# Patient Record
Sex: MALE | Race: WHITE | Employment: FULL TIME | URBAN - METROPOLITAN AREA
[De-identification: names, ages, dates, MRNs, and addresses within clinical notes are randomized per-mention and may not be internally consistent; named-entity substitution may affect disease eponyms.]

---

## 2017-11-20 ENCOUNTER — OFFICE VISIT (OUTPATIENT)
Dept: FAMILY MEDICINE CLINIC | Facility: CLINIC | Age: 37
End: 2017-11-20

## 2017-11-20 VITALS
SYSTOLIC BLOOD PRESSURE: 138 MMHG | BODY MASS INDEX: 28.73 KG/M2 | HEIGHT: 69 IN | HEART RATE: 56 BPM | WEIGHT: 194 LBS | TEMPERATURE: 98 F | DIASTOLIC BLOOD PRESSURE: 88 MMHG | RESPIRATION RATE: 16 BRPM

## 2017-11-20 DIAGNOSIS — Z13.29 SCREENING FOR ENDOCRINE, NUTRITIONAL, METABOLIC AND IMMUNITY DISORDER: ICD-10-CM

## 2017-11-20 DIAGNOSIS — Z13.228 SCREENING FOR ENDOCRINE, NUTRITIONAL, METABOLIC AND IMMUNITY DISORDER: ICD-10-CM

## 2017-11-20 DIAGNOSIS — Z00.00 ANNUAL PHYSICAL EXAM: Primary | ICD-10-CM

## 2017-11-20 DIAGNOSIS — Z13.21 SCREENING FOR ENDOCRINE, NUTRITIONAL, METABOLIC AND IMMUNITY DISORDER: ICD-10-CM

## 2017-11-20 DIAGNOSIS — Z23 NEED FOR DIPHTHERIA-TETANUS-PERTUSSIS (TDAP) VACCINE: ICD-10-CM

## 2017-11-20 DIAGNOSIS — Z13.0 SCREENING FOR ENDOCRINE, NUTRITIONAL, METABOLIC AND IMMUNITY DISORDER: ICD-10-CM

## 2017-11-20 DIAGNOSIS — R03.0 BLOOD PRESSURE ELEVATED WITHOUT HISTORY OF HTN: ICD-10-CM

## 2017-11-20 DIAGNOSIS — J30.1 ACUTE SEASONAL ALLERGIC RHINITIS DUE TO POLLEN: ICD-10-CM

## 2017-11-20 PROCEDURE — 99395 PREV VISIT EST AGE 18-39: CPT | Performed by: FAMILY MEDICINE

## 2017-11-20 PROCEDURE — 90715 TDAP VACCINE 7 YRS/> IM: CPT | Performed by: FAMILY MEDICINE

## 2017-11-20 PROCEDURE — 90471 IMMUNIZATION ADMIN: CPT | Performed by: FAMILY MEDICINE

## 2017-11-20 NOTE — PROGRESS NOTES
Chief Complaint:   Patient presents with:   Annual    HPI:   This is a 44year old male presenting for physical patient's weight is lower than previous physical.  History of elevated blood pressure with no diagnosis of hypertension he reports no shortness o hematuria, flank pain and difficulty urinating. Musculoskeletal: Negative for joint pain, gait problem, neck pain and neck stiffness. Skin: Negative for color change, pallor, rash and wound.    Allergic/Immunologic: Negative for environmental allergies, motion. He exhibits no tenderness or effusion. Lymphadenopathy:     He has no cervical adenopathy. Neurological: He is alert and oriented to person, place, and time. No cranial nerve deficit or motor deficit. Gait normal.   Skin: Skin is warm and dry.

## 2018-01-04 ENCOUNTER — LAB ENCOUNTER (OUTPATIENT)
Dept: LAB | Age: 38
End: 2018-01-04
Attending: FAMILY MEDICINE
Payer: COMMERCIAL

## 2018-01-04 DIAGNOSIS — Z13.29 SCREENING FOR ENDOCRINE, NUTRITIONAL, METABOLIC AND IMMUNITY DISORDER: ICD-10-CM

## 2018-01-04 DIAGNOSIS — Z13.228 SCREENING FOR ENDOCRINE, NUTRITIONAL, METABOLIC AND IMMUNITY DISORDER: ICD-10-CM

## 2018-01-04 DIAGNOSIS — Z13.21 SCREENING FOR ENDOCRINE, NUTRITIONAL, METABOLIC AND IMMUNITY DISORDER: ICD-10-CM

## 2018-01-04 DIAGNOSIS — Z13.0 SCREENING FOR ENDOCRINE, NUTRITIONAL, METABOLIC AND IMMUNITY DISORDER: ICD-10-CM

## 2018-01-04 LAB
ALBUMIN SERPL-MCNC: 4 G/DL (ref 3.5–4.8)
ALP LIVER SERPL-CCNC: 74 U/L (ref 45–117)
ALT SERPL-CCNC: 46 U/L (ref 17–63)
AST SERPL-CCNC: 32 U/L (ref 15–41)
BASOPHILS # BLD AUTO: 0.03 X10(3) UL (ref 0–0.1)
BASOPHILS NFR BLD AUTO: 0.6 %
BILIRUB SERPL-MCNC: 1.7 MG/DL (ref 0.1–2)
BUN BLD-MCNC: 20 MG/DL (ref 8–20)
CALCIUM BLD-MCNC: 9.1 MG/DL (ref 8.3–10.3)
CHLORIDE: 105 MMOL/L (ref 101–111)
CHOLEST SMN-MCNC: 169 MG/DL (ref ?–200)
CO2: 28 MMOL/L (ref 22–32)
CREAT BLD-MCNC: 1.01 MG/DL (ref 0.7–1.3)
EOSINOPHIL # BLD AUTO: 0.1 X10(3) UL (ref 0–0.3)
EOSINOPHIL NFR BLD AUTO: 1.9 %
ERYTHROCYTE [DISTWIDTH] IN BLOOD BY AUTOMATED COUNT: 11.8 % (ref 11.5–16)
GLUCOSE BLD-MCNC: 85 MG/DL (ref 70–99)
HCT VFR BLD AUTO: 46.5 % (ref 37–53)
HDLC SERPL-MCNC: 45 MG/DL (ref 45–?)
HDLC SERPL: 3.76 {RATIO} (ref ?–4.97)
HGB BLD-MCNC: 16.1 G/DL (ref 13–17)
IMMATURE GRANULOCYTE COUNT: 0.01 X10(3) UL (ref 0–1)
IMMATURE GRANULOCYTE RATIO %: 0.2 %
LDLC SERPL CALC-MCNC: 108 MG/DL (ref ?–130)
LYMPHOCYTES # BLD AUTO: 2.26 X10(3) UL (ref 0.9–4)
LYMPHOCYTES NFR BLD AUTO: 42.5 %
M PROTEIN MFR SERPL ELPH: 7.5 G/DL (ref 6.1–8.3)
MCH RBC QN AUTO: 29.9 PG (ref 27–33.2)
MCHC RBC AUTO-ENTMCNC: 34.6 G/DL (ref 31–37)
MCV RBC AUTO: 86.4 FL (ref 80–99)
MONOCYTES # BLD AUTO: 0.36 X10(3) UL (ref 0.1–0.6)
MONOCYTES NFR BLD AUTO: 6.8 %
NEUTROPHIL ABS PRELIM: 2.56 X10 (3) UL (ref 1.3–6.7)
NEUTROPHILS # BLD AUTO: 2.56 X10(3) UL (ref 1.3–6.7)
NEUTROPHILS NFR BLD AUTO: 48 %
NONHDLC SERPL-MCNC: 124 MG/DL (ref ?–130)
PLATELET # BLD AUTO: 198 10(3)UL (ref 150–450)
POTASSIUM SERPL-SCNC: 3.7 MMOL/L (ref 3.6–5.1)
RBC # BLD AUTO: 5.38 X10(6)UL (ref 4.3–5.7)
RED CELL DISTRIBUTION WIDTH-SD: 36.8 FL (ref 35.1–46.3)
SODIUM SERPL-SCNC: 141 MMOL/L (ref 136–144)
TRIGL SERPL-MCNC: 78 MG/DL (ref ?–150)
TSI SER-ACNC: 2.81 MIU/ML (ref 0.35–5.5)
VLDLC SERPL CALC-MCNC: 16 MG/DL (ref 5–40)
WBC # BLD AUTO: 5.3 X10(3) UL (ref 4–13)

## 2018-01-04 PROCEDURE — 80050 GENERAL HEALTH PANEL: CPT | Performed by: FAMILY MEDICINE

## 2018-01-04 PROCEDURE — 36415 COLL VENOUS BLD VENIPUNCTURE: CPT | Performed by: FAMILY MEDICINE

## 2018-01-04 PROCEDURE — 80061 LIPID PANEL: CPT | Performed by: FAMILY MEDICINE

## 2019-02-05 ENCOUNTER — PATIENT OUTREACH (OUTPATIENT)
Dept: FAMILY MEDICINE CLINIC | Facility: CLINIC | Age: 39
End: 2019-02-05

## 2019-08-30 ENCOUNTER — WALK IN (OUTPATIENT)
Dept: URGENT CARE | Age: 39
End: 2019-08-30

## 2019-08-30 DIAGNOSIS — Z23 FLU VACCINE NEED: Primary | ICD-10-CM

## 2019-08-30 PROCEDURE — 90686 IIV4 VACC NO PRSV 0.5 ML IM: CPT | Performed by: NURSE PRACTITIONER

## 2019-08-30 PROCEDURE — 90471 IMMUNIZATION ADMIN: CPT | Performed by: NURSE PRACTITIONER

## 2021-06-11 ENCOUNTER — OFFICE VISIT (OUTPATIENT)
Dept: FAMILY MEDICINE CLINIC | Facility: CLINIC | Age: 41
End: 2021-06-11
Payer: COMMERCIAL

## 2021-06-11 VITALS
HEART RATE: 77 BPM | WEIGHT: 188.75 LBS | HEIGHT: 69.13 IN | SYSTOLIC BLOOD PRESSURE: 142 MMHG | RESPIRATION RATE: 16 BRPM | OXYGEN SATURATION: 98 % | DIASTOLIC BLOOD PRESSURE: 100 MMHG | BODY MASS INDEX: 27.64 KG/M2

## 2021-06-11 DIAGNOSIS — Z00.00 ANNUAL PHYSICAL EXAM: Primary | ICD-10-CM

## 2021-06-11 DIAGNOSIS — Z00.00 LABORATORY EXAMINATION ORDERED AS PART OF A ROUTINE GENERAL MEDICAL EXAMINATION: ICD-10-CM

## 2021-06-11 DIAGNOSIS — J30.2 SEASONAL ALLERGIES: ICD-10-CM

## 2021-06-11 DIAGNOSIS — R03.0 BLOOD PRESSURE ELEVATED WITHOUT HISTORY OF HTN: ICD-10-CM

## 2021-06-11 PROCEDURE — 99396 PREV VISIT EST AGE 40-64: CPT | Performed by: FAMILY MEDICINE

## 2021-06-11 PROCEDURE — 3077F SYST BP >= 140 MM HG: CPT | Performed by: FAMILY MEDICINE

## 2021-06-11 PROCEDURE — 3080F DIAST BP >= 90 MM HG: CPT | Performed by: FAMILY MEDICINE

## 2021-06-11 PROCEDURE — 3008F BODY MASS INDEX DOCD: CPT | Performed by: FAMILY MEDICINE

## 2021-06-11 RX ORDER — MULTIVIT-MIN/FOLIC/VIT K/LYCOP 400-300MCG
TABLET ORAL
COMMUNITY

## 2021-06-12 NOTE — PROGRESS NOTES
Chief Complaint:   Patient presents with:  Physical    HPI:   This is a 42year old male presenting for physical patient's weight is lower than previous physical.  History of elevated blood pressure with no diagnosis of hypertension he reports no shortness palpitations and leg swelling. Gastrointestinal: Negative for vomiting, abdominal pain, diarrhea, blood in stool and abdominal distention. Endocrine: Negative for cold intolerance, heat intolerance, polydipsia, polyphagia and polyuria.    Genitourinary: Edema not present. Pulmonary/Chest: Effort normal and breath sounds normal. No stridor. No respiratory distress. He has no wheezes. Abdominal: Soft. Bowel sounds are normal. He exhibits no distension. There is no abdominal tenderness.  There is no rebo

## 2021-06-14 ENCOUNTER — LAB ENCOUNTER (OUTPATIENT)
Dept: LAB | Age: 41
End: 2021-06-14
Attending: FAMILY MEDICINE
Payer: COMMERCIAL

## 2021-06-14 DIAGNOSIS — Z00.00 LABORATORY EXAMINATION ORDERED AS PART OF A ROUTINE GENERAL MEDICAL EXAMINATION: ICD-10-CM

## 2021-06-14 PROCEDURE — 84439 ASSAY OF FREE THYROXINE: CPT | Performed by: FAMILY MEDICINE

## 2021-06-14 PROCEDURE — 80061 LIPID PANEL: CPT | Performed by: FAMILY MEDICINE

## 2021-06-14 PROCEDURE — 80050 GENERAL HEALTH PANEL: CPT | Performed by: FAMILY MEDICINE

## 2021-06-30 ENCOUNTER — PATIENT MESSAGE (OUTPATIENT)
Dept: FAMILY MEDICINE CLINIC | Facility: CLINIC | Age: 41
End: 2021-06-30

## 2021-06-30 RX ORDER — LISINOPRIL AND HYDROCHLOROTHIAZIDE 12.5; 1 MG/1; MG/1
1 TABLET ORAL DAILY
Qty: 30 TABLET | Refills: 3 | Status: SHIPPED | OUTPATIENT
Start: 2021-06-30 | End: 2022-01-10

## 2021-06-30 NOTE — TELEPHONE ENCOUNTER
From: Gill Hurtado  To: Jojo Marinelli MD  Sent: 6/30/2021 1:58 PM CDT  Subject: Visit Julio César Favor Dr. Lissy Shaffer,    I'm following up with you about my blood pressure from my appointment from June 11th. I've been monitoring it the last couple of weeks and it is still in the 130s/140s over 90s. I've tried to vary the time of day, before/after meals, etc.    You mentioned possibly going on something for this. I'm thinking that might not be a bad idea considering I exercise regularly and generally eat well. Please advise, and thank you very much for your assistance.     Harvey Prieto

## 2021-06-30 NOTE — TELEPHONE ENCOUNTER
Pt sent message stating he has been monitoring BP at home and BP still is in the 130s to 140s over 90s. Per pt, he has checked BP at different times of the day. Pt asking if he can be placed on medication as previously discussed at this last ov. Please advise. Thank you.    LOV: 6/11/21

## 2022-01-11 RX ORDER — LISINOPRIL AND HYDROCHLOROTHIAZIDE 12.5; 1 MG/1; MG/1
1 TABLET ORAL DAILY
Qty: 30 TABLET | Refills: 0 | Status: SHIPPED | OUTPATIENT
Start: 2022-01-11 | End: 2022-02-10

## 2022-04-07 NOTE — TELEPHONE ENCOUNTER
Pt sent message requesting for medication refill of Lisinopril-hydroCHLOROthiazide 10-12.5 MG Oral Tablet. Pt was instructed to schedule appt since last ov was 06/11/21. Pt did not schedule f/u ofelia for August/Sept 2021. Please approve or deny pending Rx. Thank you.    LF: 01/11/22

## 2022-04-08 RX ORDER — LISINOPRIL AND HYDROCHLOROTHIAZIDE 12.5; 1 MG/1; MG/1
1 TABLET ORAL DAILY
Qty: 30 TABLET | Refills: 0 | Status: SHIPPED | OUTPATIENT
Start: 2022-04-08 | End: 2022-05-08

## 2022-04-18 ENCOUNTER — OFFICE VISIT (OUTPATIENT)
Dept: FAMILY MEDICINE CLINIC | Facility: CLINIC | Age: 42
End: 2022-04-18
Payer: COMMERCIAL

## 2022-04-18 VITALS
SYSTOLIC BLOOD PRESSURE: 140 MMHG | OXYGEN SATURATION: 98 % | HEART RATE: 76 BPM | WEIGHT: 188 LBS | DIASTOLIC BLOOD PRESSURE: 82 MMHG | HEIGHT: 69.13 IN | RESPIRATION RATE: 16 BRPM | BODY MASS INDEX: 27.53 KG/M2

## 2022-04-18 DIAGNOSIS — R03.0 BLOOD PRESSURE ELEVATED WITHOUT HISTORY OF HTN: ICD-10-CM

## 2022-04-18 DIAGNOSIS — J30.2 SEASONAL ALLERGIES: Primary | ICD-10-CM

## 2022-04-18 PROCEDURE — 3077F SYST BP >= 140 MM HG: CPT | Performed by: FAMILY MEDICINE

## 2022-04-18 PROCEDURE — 99214 OFFICE O/P EST MOD 30 MIN: CPT | Performed by: FAMILY MEDICINE

## 2022-04-18 PROCEDURE — 3079F DIAST BP 80-89 MM HG: CPT | Performed by: FAMILY MEDICINE

## 2022-04-18 PROCEDURE — 3008F BODY MASS INDEX DOCD: CPT | Performed by: FAMILY MEDICINE

## 2022-04-18 RX ORDER — LISINOPRIL AND HYDROCHLOROTHIAZIDE 12.5; 1 MG/1; MG/1
1 TABLET ORAL DAILY
Qty: 90 TABLET | Refills: 1 | Status: SHIPPED | OUTPATIENT
Start: 2022-04-18 | End: 2022-07-17

## 2022-11-04 ENCOUNTER — OFFICE VISIT (OUTPATIENT)
Dept: FAMILY MEDICINE CLINIC | Facility: CLINIC | Age: 42
End: 2022-11-04
Payer: COMMERCIAL

## 2022-11-04 VITALS
BODY MASS INDEX: 29.87 KG/M2 | DIASTOLIC BLOOD PRESSURE: 84 MMHG | SYSTOLIC BLOOD PRESSURE: 120 MMHG | HEART RATE: 68 BPM | WEIGHT: 204 LBS | OXYGEN SATURATION: 97 % | RESPIRATION RATE: 16 BRPM | HEIGHT: 69.13 IN

## 2022-11-04 DIAGNOSIS — R03.0 BLOOD PRESSURE ELEVATED WITHOUT HISTORY OF HTN: ICD-10-CM

## 2022-11-04 DIAGNOSIS — R73.03 PRE-DIABETES: ICD-10-CM

## 2022-11-04 DIAGNOSIS — S46.912A STRAIN OF LEFT SHOULDER, INITIAL ENCOUNTER: ICD-10-CM

## 2022-11-04 DIAGNOSIS — R06.81 APNEA: ICD-10-CM

## 2022-11-04 DIAGNOSIS — Z00.00 ANNUAL PHYSICAL EXAM: Primary | ICD-10-CM

## 2022-11-04 PROCEDURE — 3008F BODY MASS INDEX DOCD: CPT | Performed by: FAMILY MEDICINE

## 2022-11-04 PROCEDURE — 99396 PREV VISIT EST AGE 40-64: CPT | Performed by: FAMILY MEDICINE

## 2022-11-04 PROCEDURE — 3079F DIAST BP 80-89 MM HG: CPT | Performed by: FAMILY MEDICINE

## 2022-11-04 PROCEDURE — 3074F SYST BP LT 130 MM HG: CPT | Performed by: FAMILY MEDICINE

## 2022-11-04 RX ORDER — LISINOPRIL AND HYDROCHLOROTHIAZIDE 12.5; 1 MG/1; MG/1
1 TABLET ORAL DAILY
COMMUNITY
End: 2022-11-05

## 2022-11-07 RX ORDER — LISINOPRIL AND HYDROCHLOROTHIAZIDE 12.5; 1 MG/1; MG/1
1 TABLET ORAL DAILY
Qty: 90 TABLET | Refills: 3 | Status: SHIPPED | OUTPATIENT
Start: 2022-11-07

## 2022-11-18 ENCOUNTER — TELEPHONE (OUTPATIENT)
Dept: PHYSICAL THERAPY | Facility: HOSPITAL | Age: 42
End: 2022-11-18

## 2022-11-18 ENCOUNTER — LAB ENCOUNTER (OUTPATIENT)
Dept: LAB | Age: 42
End: 2022-11-18
Attending: FAMILY MEDICINE
Payer: COMMERCIAL

## 2022-11-18 DIAGNOSIS — R03.0 BLOOD PRESSURE ELEVATED WITHOUT HISTORY OF HTN: ICD-10-CM

## 2022-11-18 DIAGNOSIS — R73.03 PRE-DIABETES: ICD-10-CM

## 2022-11-18 LAB
ALBUMIN SERPL-MCNC: 3.9 G/DL (ref 3.4–5)
ALBUMIN/GLOB SERPL: 1.1 {RATIO} (ref 1–2)
ALP LIVER SERPL-CCNC: 77 U/L
ALT SERPL-CCNC: 31 U/L
ANION GAP SERPL CALC-SCNC: 8 MMOL/L (ref 0–18)
AST SERPL-CCNC: 18 U/L (ref 15–37)
BASOPHILS # BLD AUTO: 0.07 X10(3) UL (ref 0–0.2)
BASOPHILS NFR BLD AUTO: 1 %
BILIRUB SERPL-MCNC: 1.6 MG/DL (ref 0.1–2)
BUN BLD-MCNC: 19 MG/DL (ref 7–18)
CALCIUM BLD-MCNC: 9.4 MG/DL (ref 8.5–10.1)
CHLORIDE SERPL-SCNC: 103 MMOL/L (ref 98–112)
CHOLEST SERPL-MCNC: 176 MG/DL (ref ?–200)
CO2 SERPL-SCNC: 27 MMOL/L (ref 21–32)
CREAT BLD-MCNC: 1.06 MG/DL
EOSINOPHIL # BLD AUTO: 0.11 X10(3) UL (ref 0–0.7)
EOSINOPHIL NFR BLD AUTO: 1.6 %
ERYTHROCYTE [DISTWIDTH] IN BLOOD BY AUTOMATED COUNT: 12.3 %
EST. AVERAGE GLUCOSE BLD GHB EST-MCNC: 103 MG/DL (ref 68–126)
FASTING PATIENT LIPID ANSWER: YES
FASTING STATUS PATIENT QL REPORTED: YES
GFR SERPLBLD BASED ON 1.73 SQ M-ARVRAT: 89 ML/MIN/1.73M2 (ref 60–?)
GLOBULIN PLAS-MCNC: 3.5 G/DL (ref 2.8–4.4)
GLUCOSE BLD-MCNC: 105 MG/DL (ref 70–99)
HBA1C MFR BLD: 5.2 % (ref ?–5.7)
HCT VFR BLD AUTO: 49.4 %
HDLC SERPL-MCNC: 47 MG/DL (ref 40–59)
HGB BLD-MCNC: 16.7 G/DL
IMM GRANULOCYTES # BLD AUTO: 0.01 X10(3) UL (ref 0–1)
IMM GRANULOCYTES NFR BLD: 0.1 %
LDLC SERPL CALC-MCNC: 119 MG/DL (ref ?–100)
LYMPHOCYTES # BLD AUTO: 2.17 X10(3) UL (ref 1–4)
LYMPHOCYTES NFR BLD AUTO: 31 %
MCH RBC QN AUTO: 30.9 PG (ref 26–34)
MCHC RBC AUTO-ENTMCNC: 33.8 G/DL (ref 31–37)
MCV RBC AUTO: 91.3 FL
MONOCYTES # BLD AUTO: 0.51 X10(3) UL (ref 0.1–1)
MONOCYTES NFR BLD AUTO: 7.3 %
NEUTROPHILS # BLD AUTO: 4.12 X10 (3) UL (ref 1.5–7.7)
NEUTROPHILS # BLD AUTO: 4.12 X10(3) UL (ref 1.5–7.7)
NEUTROPHILS NFR BLD AUTO: 59 %
NONHDLC SERPL-MCNC: 129 MG/DL (ref ?–130)
OSMOLALITY SERPL CALC.SUM OF ELEC: 289 MOSM/KG (ref 275–295)
PLATELET # BLD AUTO: 205 10(3)UL (ref 150–450)
POTASSIUM SERPL-SCNC: 4.1 MMOL/L (ref 3.5–5.1)
PROT SERPL-MCNC: 7.4 G/DL (ref 6.4–8.2)
RBC # BLD AUTO: 5.41 X10(6)UL
SODIUM SERPL-SCNC: 138 MMOL/L (ref 136–145)
T4 FREE SERPL-MCNC: 1 NG/DL (ref 0.8–1.7)
TRIGL SERPL-MCNC: 49 MG/DL (ref 30–149)
TSI SER-ACNC: 1.44 MIU/ML (ref 0.36–3.74)
VLDLC SERPL CALC-MCNC: 9 MG/DL (ref 0–30)
WBC # BLD AUTO: 7 X10(3) UL (ref 4–11)

## 2022-11-18 PROCEDURE — 84439 ASSAY OF FREE THYROXINE: CPT | Performed by: FAMILY MEDICINE

## 2022-11-18 PROCEDURE — 80061 LIPID PANEL: CPT | Performed by: FAMILY MEDICINE

## 2022-11-18 PROCEDURE — 83036 HEMOGLOBIN GLYCOSYLATED A1C: CPT | Performed by: FAMILY MEDICINE

## 2022-11-18 PROCEDURE — 80050 GENERAL HEALTH PANEL: CPT | Performed by: FAMILY MEDICINE

## 2022-11-22 ENCOUNTER — OFFICE VISIT (OUTPATIENT)
Dept: PHYSICAL THERAPY | Age: 42
End: 2022-11-22
Attending: FAMILY MEDICINE
Payer: COMMERCIAL

## 2022-11-22 DIAGNOSIS — S46.912D STRAIN OF LEFT SHOULDER, SUBSEQUENT ENCOUNTER: ICD-10-CM

## 2022-11-22 DIAGNOSIS — M25.512 ACUTE PAIN OF LEFT SHOULDER: Primary | ICD-10-CM

## 2022-11-22 PROCEDURE — 97161 PT EVAL LOW COMPLEX 20 MIN: CPT

## 2022-11-22 PROCEDURE — 97110 THERAPEUTIC EXERCISES: CPT

## 2022-11-28 ENCOUNTER — OFFICE VISIT (OUTPATIENT)
Dept: PHYSICAL THERAPY | Age: 42
End: 2022-11-28
Attending: FAMILY MEDICINE
Payer: COMMERCIAL

## 2022-11-28 DIAGNOSIS — M25.512 ACUTE PAIN OF LEFT SHOULDER: Primary | ICD-10-CM

## 2022-11-28 DIAGNOSIS — S46.912D STRAIN OF LEFT SHOULDER, SUBSEQUENT ENCOUNTER: ICD-10-CM

## 2022-11-28 PROCEDURE — 97110 THERAPEUTIC EXERCISES: CPT

## 2022-11-28 PROCEDURE — 97140 MANUAL THERAPY 1/> REGIONS: CPT

## 2022-11-28 NOTE — PROGRESS NOTES
PT DAILY NOTE  Diagnosis:   Acute left shoulder pain (M25.512), Strain of left shoulder subsequent encounter (S40.989I)   Referring Provider: Jose Alberto Whitley  Date of Evaluation:    11/21/2022    Precautions:  None Next MD visit:   none scheduled  Date of Surgery: n/a  Symptom onset: August 2022 after lifting weights     Insurance Primary/Secondary: [employer] PPO     Visit 2 of 8   Date POC Expires: 2-       Subjective: Pt states ex's are helping. \"Slept on it a little weird\", mild pain. Pain: 2/10 at start of session  @eval: Dave Pillai is a 36year old male who presents to therapy today with complaints of left shoulder pain since August after lifting weights at home. Thought it was a muscle strain, but it hasn't gotten much better, despite stopping weight lifting. Pain c reaching behind back, putting belt on, has improved c 'fly' movement but still painful. He prefers to sleep on Lf side, which he can still do, but it feels stiff in the morning. Denies any N/T LUE. Pt is Rt hand dominant. 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 No previous hx of this pain. Pt describes pain level at best 0/10, at worst 3/10. Current functional limitations include reaching behind back, abd/fly position, laying on Lf side, has stopped wtlifting and holding off on planks. Rosanna Claros describes prior level of function full and painfree. Pt goals include resolve pain. Past medical history was reviewed with Rosanna Claros. Significant findings include pre-diabetes. Objective:   Mild GH hypomobility AP glide today, added jt mobs  Added LUE behind back stretching, c scap retracted, able to perform painfree    Treatment:(\"NP\" indicates Not Performed this date)  Manual Therapy: Added STM Lf UT/supraspinatus  Added Lf GH AP glides    Neuromuscular Re-education:    Therapeutic Exercise:   Adde retro UBE for scap mobility 5min  Prone scap squeezes 5\"x10  Prone mid trap 5\"x10  Added standing scap retract+ BUE ext c cane 3\"x10  Added standing IR behind back stretch c cane 10\"x5  Standing B shld ER YTB 5\"x10  Added seated Lf levator scap stretch 20\"x3  Doorway stretch (arms low) 20\"x3  Added bentover row 6# 2\"x20 ea Lf, Rt  Added bentover shld ext 6# 2\"x20 ea Lf, Rt  Added D1 extension GTB Lf 5\"x10    Future sessions: side forearm plank, SB pushups on wall, bodyblade, scaption    HEP: ([email or web address], code [email or web address])  Initial-prone scap squeezes, doorway stretch, B shld ER vs YTB  11-28-22: add bentover rows, has 8# at home    Assessment/Plan: Pt tolerated several new ex's today s any incr in pain. He has some upper quarter tighntess on Lf which was addressed c manual tx. New ex's today to promote incr scapular strength and open anterior shld to decrease impingement. Cont to address deficits to work toward [employer] and set goals. PLAN OF CARE:    Goals: (to be met in 8 visits)   1. Consistently decr pain Lf shld < or = 1/10 intermittent for incr QOL and activity tolerance  2. Pt able to sleep on Lf side painfree for PLOF  3. incr Lf shld/scap MMT at least 1/2 grade painfree for improved shoulder mechanics s symptoms  4. Pt able to reach behind back painfree for able to put on belt or wash back or PLOF  5. indep HEP to promote cont progress toward functional goals    Frequency / Duration: Patient will be seen for 2 x/week or a total of 8 visits over a 90 day period. All Treatments performed at distinct and separate times during the therapy session.   Treatment Minutes  Units charged   Manual Therapy 8 minutes 1   Therapeutic Activity 0 minutes    Neuromuscular Re-education 0 minutes    Therapeutic Exercise 37 minutes 2   Total Direct Treatment Time 45 minutes

## 2022-12-02 ENCOUNTER — APPOINTMENT (OUTPATIENT)
Dept: PHYSICAL THERAPY | Age: 42
End: 2022-12-02
Attending: FAMILY MEDICINE
Payer: COMMERCIAL

## 2022-12-02 ENCOUNTER — TELEPHONE (OUTPATIENT)
Dept: PHYSICAL THERAPY | Facility: HOSPITAL | Age: 42
End: 2022-12-02

## 2022-12-05 ENCOUNTER — OFFICE VISIT (OUTPATIENT)
Dept: PHYSICAL THERAPY | Age: 42
End: 2022-12-05
Attending: FAMILY MEDICINE
Payer: COMMERCIAL

## 2022-12-05 DIAGNOSIS — S46.912D STRAIN OF LEFT SHOULDER, SUBSEQUENT ENCOUNTER: ICD-10-CM

## 2022-12-05 DIAGNOSIS — M25.512 ACUTE PAIN OF LEFT SHOULDER: Primary | ICD-10-CM

## 2022-12-05 PROCEDURE — 97110 THERAPEUTIC EXERCISES: CPT

## 2022-12-05 PROCEDURE — 97140 MANUAL THERAPY 1/> REGIONS: CPT

## 2022-12-05 NOTE — PROGRESS NOTES
PT DAILY NOTE  Diagnosis:   Acute left shoulder pain (M25.512), Strain of left shoulder subsequent encounter (X12.298K)   Referring Provider: Jules Oliva  Date of Evaluation:    11/21/2022    Precautions:  None Next MD visit:   none scheduled  Date of Surgery: n/a  Symptom onset: August 2022 after lifting weights     Insurance Primary/Secondary: [employer] PPO     Visit 3 of 8   Date POC Expires: 2-       Subjective: Pt states he was a little sore this morning after sleeping on Rt side and Lf arm being in an awkward position overnight. He is able to sleep comfortably on the Lf side. Pain: 0/10 at start of session  @eval: Nico Hernandez is a 36year old male who presents to therapy today with complaints of left shoulder pain since August after lifting weights at home. Thought it was a muscle strain, but it hasn't gotten much better, despite stopping weight lifting. Pain c reaching behind back, putting belt on, has improved c 'fly' movement but still painful. He prefers to sleep on Lf side, which he can still do, but it feels stiff in the morning. Denies any N/T LUE. Pt is Rt hand dominant. 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 No previous hx of this pain. Pt describes pain level at best 0/10, at worst 3/10. Current functional limitations include reaching behind back, abd/fly position, laying on Lf side, has stopped wtlifting and holding off on planks. Henok Razo describes prior level of function full and painfree. Pt goals include resolve pain. Past medical history was reviewed with Henok Razo. Significant findings include pre-diabetes.     Objective:   Pt reports notably incr difficulty Lf side plank vs Rt, but able to perform s pain    Treatment:(\"NP\" indicates Not Performed this date)  Manual Therapy:  STM Lf UT/supraspinatus  Lf GH AP caleb    Neuromuscular Re-education:    Therapeutic Exercise:  retro UBE for scap mobility 5min  Prone scap squeezes 5\"x10  Prone mid trap 5\"x10  standing scap retract+ BUE ext c cane 3\"x10  standing IR behind back stretch c cane 10\"x5  Standing B shld ER YTB 5\"x10  seated Lf levator scap stretch 20\"x3  Doorway stretch (arms low) 20\"x3  bentover row 6# 2\"x20 ea Lf, Rt  bentover shld ext 6# 2\"x20 ea Lf, Rt  D1 extension GTB Lf 5\"x15 (increased)  Added side planks c straight legs 10\"x5  Added B ->single-arm straight arm plank rotation x 10 ea Rt, Lf     Future sessions: SB pushups on wall, bodyblade, scaption    HEP: ([email or web address], code [email or web address])  Initial-prone scap squeezes, doorway stretch, B shld ER vs YTB  11-28-22: add bentover rows, has 8# at home  12-5-22: pt to add side planks    Assessment/Plan: Pt reports difficulty c sustained postures overnight or performing an activity, but no lasting pain. Added side planks and rotational/stabilization B to single-arm planks which he tolerated well, c appropriate challenge. Pt to add side planks to HEP. Continue to progress as tolerated. PLAN OF CARE:    Goals: (to be met in 8 visits)   1. Consistently decr pain Lf shld < or = 1/10 intermittent for incr QOL and activity tolerance  2. Pt able to sleep on Lf side painfree for PLOF  3. incr Lf shld/scap MMT at least 1/2 grade painfree for improved shoulder mechanics s symptoms  4. Pt able to reach behind back painfree for able to put on belt or wash back or PLOF  5. indep HEP to promote cont progress toward functional goals    Frequency / Duration: Patient will be seen for 2 x/week or a total of 8 visits over a 90 day period. All Treatments performed at distinct and separate times during the therapy session.   Treatment Minutes  Units charged   Manual Therapy 8 minutes 1   Therapeutic Activity 0 minutes    Neuromuscular Re-education 0 minutes    Therapeutic Exercise 37 minutes 2   Total Direct Treatment Time 45 minutes

## 2022-12-09 ENCOUNTER — APPOINTMENT (OUTPATIENT)
Dept: PHYSICAL THERAPY | Age: 42
End: 2022-12-09
Attending: FAMILY MEDICINE
Payer: COMMERCIAL

## 2022-12-12 ENCOUNTER — APPOINTMENT (OUTPATIENT)
Dept: PHYSICAL THERAPY | Age: 42
End: 2022-12-12
Attending: FAMILY MEDICINE
Payer: COMMERCIAL

## 2022-12-12 ENCOUNTER — TELEPHONE (OUTPATIENT)
Dept: PHYSICAL THERAPY | Facility: HOSPITAL | Age: 42
End: 2022-12-12

## 2022-12-28 NOTE — TELEPHONE ENCOUNTER
LF 6/30/2021 with 30 tabs + 3 refills   LOV 6/11/2021  Follow up 6-12 months / due for f/u
Discharged

## 2023-01-05 ENCOUNTER — OFFICE VISIT (OUTPATIENT)
Dept: SLEEP CENTER | Age: 43
End: 2023-01-05
Attending: FAMILY MEDICINE
Payer: COMMERCIAL

## 2023-01-05 DIAGNOSIS — R06.81 APNEA: ICD-10-CM

## 2023-01-05 PROCEDURE — 95810 POLYSOM 6/> YRS 4/> PARAM: CPT

## 2023-01-05 PROCEDURE — 95811 POLYSOM 6/>YRS CPAP 4/> PARM: CPT

## 2023-01-12 ENCOUNTER — SLEEP STUDY (OUTPATIENT)
Facility: CLINIC | Age: 43
End: 2023-01-12
Payer: COMMERCIAL

## 2023-01-12 DIAGNOSIS — G47.33 OBSTRUCTIVE SLEEP APNEA SYNDROME: Primary | ICD-10-CM

## 2023-01-12 PROCEDURE — 95810 POLYSOM 6/> YRS 4/> PARAM: CPT | Performed by: OTHER

## 2023-01-19 ENCOUNTER — TELEPHONE (OUTPATIENT)
Dept: PHYSICAL THERAPY | Age: 43
End: 2023-01-19

## 2023-02-17 ENCOUNTER — TELEPHONE (OUTPATIENT)
Dept: PHYSICAL THERAPY | Facility: HOSPITAL | Age: 43
End: 2023-02-17

## 2023-09-18 ENCOUNTER — OFFICE VISIT (OUTPATIENT)
Dept: FAMILY MEDICINE CLINIC | Facility: CLINIC | Age: 43
End: 2023-09-18
Payer: COMMERCIAL

## 2023-09-18 VITALS
RESPIRATION RATE: 16 BRPM | SYSTOLIC BLOOD PRESSURE: 120 MMHG | OXYGEN SATURATION: 95 % | DIASTOLIC BLOOD PRESSURE: 80 MMHG | WEIGHT: 203 LBS | BODY MASS INDEX: 29.73 KG/M2 | HEIGHT: 69.13 IN | HEART RATE: 90 BPM

## 2023-09-18 DIAGNOSIS — Z00.00 ANNUAL PHYSICAL EXAM: Primary | ICD-10-CM

## 2023-09-18 DIAGNOSIS — I10 PRIMARY HYPERTENSION: ICD-10-CM

## 2023-09-18 PROCEDURE — 3079F DIAST BP 80-89 MM HG: CPT | Performed by: FAMILY MEDICINE

## 2023-09-18 PROCEDURE — 3074F SYST BP LT 130 MM HG: CPT | Performed by: FAMILY MEDICINE

## 2023-09-18 PROCEDURE — 3008F BODY MASS INDEX DOCD: CPT | Performed by: FAMILY MEDICINE

## 2023-09-18 PROCEDURE — 99396 PREV VISIT EST AGE 40-64: CPT | Performed by: FAMILY MEDICINE

## 2023-09-18 RX ORDER — LISINOPRIL AND HYDROCHLOROTHIAZIDE 12.5; 1 MG/1; MG/1
1 TABLET ORAL DAILY
Qty: 90 TABLET | Refills: 3 | Status: SHIPPED | OUTPATIENT
Start: 2023-09-18

## 2023-09-30 ENCOUNTER — PATIENT MESSAGE (OUTPATIENT)
Dept: FAMILY MEDICINE CLINIC | Facility: CLINIC | Age: 43
End: 2023-09-30

## 2023-09-30 DIAGNOSIS — Z00.00 LABORATORY EXAMINATION ORDERED AS PART OF A ROUTINE GENERAL MEDICAL EXAMINATION: Primary | ICD-10-CM

## 2023-10-02 NOTE — TELEPHONE ENCOUNTER
From: Angella Keith  To: Tereza Wright  Sent: 9/30/2023 7:17 PM CDT  Subject: Lab Request?     Hi doctor,  I was in a week and half ago, I went in this morning to do my blood labs and the lady at check in said that they were not requested by you. Just checking if I should have those done or not.  Thank you,     [employer]

## 2023-11-02 DIAGNOSIS — Z13.6 SCREENING FOR ISCHEMIC HEART DISEASE: Primary | ICD-10-CM

## 2023-11-04 ENCOUNTER — LAB ENCOUNTER (OUTPATIENT)
Dept: LAB | Age: 43
End: 2023-11-04
Attending: FAMILY MEDICINE
Payer: COMMERCIAL

## 2023-11-04 DIAGNOSIS — Z00.00 LABORATORY EXAMINATION ORDERED AS PART OF A ROUTINE GENERAL MEDICAL EXAMINATION: ICD-10-CM

## 2023-11-04 LAB
ALBUMIN SERPL-MCNC: 4 G/DL (ref 3.4–5)
ALBUMIN/GLOB SERPL: 1.2 {RATIO} (ref 1–2)
ALP LIVER SERPL-CCNC: 87 U/L
ALT SERPL-CCNC: 44 U/L
ANION GAP SERPL CALC-SCNC: 3 MMOL/L (ref 0–18)
AST SERPL-CCNC: 25 U/L (ref 15–37)
BASOPHILS # BLD AUTO: 0.06 X10(3) UL (ref 0–0.2)
BASOPHILS NFR BLD AUTO: 1 %
BILIRUB SERPL-MCNC: 1.3 MG/DL (ref 0.1–2)
BUN BLD-MCNC: 15 MG/DL (ref 9–23)
CALCIUM BLD-MCNC: 9.3 MG/DL (ref 8.5–10.1)
CHLORIDE SERPL-SCNC: 107 MMOL/L (ref 98–112)
CHOLEST SERPL-MCNC: 157 MG/DL (ref ?–200)
CO2 SERPL-SCNC: 31 MMOL/L (ref 21–32)
CREAT BLD-MCNC: 1.11 MG/DL
EGFRCR SERPLBLD CKD-EPI 2021: 84 ML/MIN/1.73M2 (ref 60–?)
EOSINOPHIL # BLD AUTO: 0.18 X10(3) UL (ref 0–0.7)
EOSINOPHIL NFR BLD AUTO: 3.1 %
ERYTHROCYTE [DISTWIDTH] IN BLOOD BY AUTOMATED COUNT: 12 %
FASTING PATIENT LIPID ANSWER: YES
FASTING STATUS PATIENT QL REPORTED: YES
GLOBULIN PLAS-MCNC: 3.3 G/DL (ref 2.8–4.4)
GLUCOSE BLD-MCNC: 96 MG/DL (ref 70–99)
HCT VFR BLD AUTO: 48 %
HDLC SERPL-MCNC: 45 MG/DL (ref 40–59)
HGB BLD-MCNC: 16.4 G/DL
IMM GRANULOCYTES # BLD AUTO: 0.02 X10(3) UL (ref 0–1)
IMM GRANULOCYTES NFR BLD: 0.3 %
LDLC SERPL CALC-MCNC: 103 MG/DL (ref ?–100)
LYMPHOCYTES # BLD AUTO: 2.16 X10(3) UL (ref 1–4)
LYMPHOCYTES NFR BLD AUTO: 37.5 %
MCH RBC QN AUTO: 30.8 PG (ref 26–34)
MCHC RBC AUTO-ENTMCNC: 34.2 G/DL (ref 31–37)
MCV RBC AUTO: 90.2 FL
MONOCYTES # BLD AUTO: 0.4 X10(3) UL (ref 0.1–1)
MONOCYTES NFR BLD AUTO: 6.9 %
NEUTROPHILS # BLD AUTO: 2.94 X10 (3) UL (ref 1.5–7.7)
NEUTROPHILS # BLD AUTO: 2.94 X10(3) UL (ref 1.5–7.7)
NEUTROPHILS NFR BLD AUTO: 51.2 %
NONHDLC SERPL-MCNC: 112 MG/DL (ref ?–130)
OSMOLALITY SERPL CALC.SUM OF ELEC: 293 MOSM/KG (ref 275–295)
PLATELET # BLD AUTO: 201 10(3)UL (ref 150–450)
POTASSIUM SERPL-SCNC: 4.6 MMOL/L (ref 3.5–5.1)
PROT SERPL-MCNC: 7.3 G/DL (ref 6.4–8.2)
RBC # BLD AUTO: 5.32 X10(6)UL
SODIUM SERPL-SCNC: 141 MMOL/L (ref 136–145)
TRIGL SERPL-MCNC: 38 MG/DL (ref 30–149)
TSI SER-ACNC: 1.62 MIU/ML (ref 0.36–3.74)
VLDLC SERPL CALC-MCNC: 6 MG/DL (ref 0–30)
WBC # BLD AUTO: 5.8 X10(3) UL (ref 4–11)

## 2023-11-04 PROCEDURE — 80050 GENERAL HEALTH PANEL: CPT | Performed by: FAMILY MEDICINE

## 2023-11-04 PROCEDURE — 80061 LIPID PANEL: CPT | Performed by: FAMILY MEDICINE

## 2023-11-16 ENCOUNTER — IMAGING SERVICES (OUTPATIENT)
Dept: CT IMAGING | Age: 43
End: 2023-11-16

## 2023-11-16 DIAGNOSIS — Z13.6 SCREENING FOR ISCHEMIC HEART DISEASE: ICD-10-CM

## 2023-11-16 PROCEDURE — 75571 CT HRT W/O DYE W/CA TEST: CPT | Performed by: RADIOLOGY

## 2023-11-30 ENCOUNTER — TELEPHONE (OUTPATIENT)
Dept: CARDIOLOGY | Age: 43
End: 2023-11-30

## 2024-08-10 ENCOUNTER — HOSPITAL ENCOUNTER (EMERGENCY)
Age: 44
Discharge: HOME | End: 2024-08-10
Attending: EMERGENCY MEDICINE
Payer: COMMERCIAL

## 2024-08-10 VITALS
OXYGEN SATURATION: 99 % | TEMPERATURE: 98 F | DIASTOLIC BLOOD PRESSURE: 110 MMHG | BODY MASS INDEX: 28.14 KG/M2 | WEIGHT: 190 LBS | HEART RATE: 65 BPM | HEIGHT: 69 IN | SYSTOLIC BLOOD PRESSURE: 170 MMHG | RESPIRATION RATE: 16 BRPM

## 2024-08-10 DIAGNOSIS — I10 ELEVATED BLOOD PRESSURE READING IN OFFICE WITH DIAGNOSIS OF HYPERTENSION: ICD-10-CM

## 2024-08-10 DIAGNOSIS — L03.114 CELLULITIS OF LEFT UPPER EXTREMITY: Primary | ICD-10-CM

## 2024-08-10 PROCEDURE — 99284 EMERGENCY DEPT VISIT MOD MDM: CPT

## 2024-08-10 PROCEDURE — 99283 EMERGENCY DEPT VISIT LOW MDM: CPT

## 2024-08-10 RX ORDER — LISINOPRIL AND HYDROCHLOROTHIAZIDE 12.5; 1 MG/1; MG/1
1 TABLET ORAL DAILY
Qty: 30 TABLET | Refills: 0 | Status: SHIPPED | OUTPATIENT
Start: 2024-08-10 | End: 2024-09-09

## 2024-08-10 RX ORDER — SULFAMETHOXAZOLE AND TRIMETHOPRIM 800; 160 MG/1; MG/1
1 TABLET ORAL 2 TIMES DAILY
Qty: 14 TABLET | Refills: 0 | Status: SHIPPED | OUTPATIENT
Start: 2024-08-10 | End: 2024-08-17

## 2024-08-10 RX ORDER — CEFADROXIL 500 MG/1
500 CAPSULE ORAL 2 TIMES DAILY
Qty: 14 CAPSULE | Refills: 0 | Status: SHIPPED | OUTPATIENT
Start: 2024-08-10 | End: 2024-08-17

## 2024-08-11 NOTE — ED INITIAL ASSESSMENT (HPI)
Patient to ER with c/o swelling to left upper arm, possible bug bite. Patient states, \"it was itching this morning and the redness and swelling has been spreading\". No pain medications taken PTA.

## 2024-08-11 NOTE — ED PROVIDER NOTES
Patient Seen in: [facility] [place]      History     Chief Complaint   Patient presents with    Bite    Cellulitis     Stated Complaint: bug bite on inside of left arm, which is spreading    Subjective:   HPI    44-year-old male who comes in today after accidentally scratching what he believes is a bug bite on his left arm and now having erythema surrounding the area.  Patient denies history of MRSA.  Patient is up-to-date on immunizations, patient does have a history of hypertension and has ran out of his medications and has not taken them for at least 10 days.  He is asymptomatic.    Objective:   Past Medical History:    Elevated bilirubin    on labs    Hand pain    intermittent, job-related    Knee problem    L knee infection, hospitalized [facility]    Lipid screening    Seasonal allergies              History reviewed. No pertinent surgical history.             Social History     Socioeconomic History    Marital status:    Tobacco Use    Smoking status: Never    Smokeless tobacco: Never   Vaping Use    Vaping status: Never Used   Substance and Sexual Activity    Alcohol use: No    Drug use: No   Other Topics Concern    Caffeine Concern Yes     Comment: occ soda    Exercise Yes     Comment: avid exerciser    Seat Belt Yes              Review of Systems    Positive for stated Chief Complaint: Bite and Cellulitis    Other systems are as noted in HPI.  Constitutional and vital signs reviewed.      All other systems reviewed and negative except as noted above.    Physical Exam     ED Triage Vitals [08/10/24 2128]   BP (!) 162/114   Pulse 77   Resp 18   Temp 98.2 °F (36.8 °C)   Temp src Oral   SpO2 98 %   O2 Device None (Room air)       Current Vitals:   Vital Signs  BP: (!) 170/110 (pt out of BP med-refill sent by provider. pt instructed to take med as soon as filled. pt is asymptomatic.)  Pulse: 65  Resp: 16  Temp: 98.2 °F (36.8 °C)  Temp src: Oral    Oxygen Therapy  SpO2: 99  %  O2 Device: None (Room air)            Physical Exam  Vitals and nursing note reviewed.   Constitutional:       General: He is not in acute distress.     Appearance: Normal appearance. He is well-developed. He is not diaphoretic.   HENT:      Head: Normocephalic and atraumatic.   Cardiovascular:      Rate and Rhythm: Normal rate and regular rhythm.   Pulmonary:      Effort: Pulmonary effort is normal. No respiratory distress.      Breath sounds: Normal breath sounds.   Musculoskeletal:         General: Swelling and tenderness present.      Cervical back: Normal range of motion.   Skin:     General: Skin is warm and dry.      Capillary Refill: Capillary refill takes less than 2 seconds.      Coloration: Skin is not pale.      Findings: Erythema present. No rash.      Comments: See pictures patient has 15 cm x 12 cm area of erythema to the upper arm with small either ingrown hair or bug bite in the center no lymphangitis or red streaking to the axilla area was demarcated   Neurological:      Mental Status: He is alert and oriented to person, place, and time.      Motor: No abnormal muscle tone.      Coordination: Coordination normal.      Deep Tendon Reflexes: Reflexes are normal and symmetric.   Psychiatric:         Behavior: Behavior normal.         Thought Content: Thought content normal.         Judgment: Judgment normal.           ED Course   Labs Reviewed - No data to display          MDM          Medical Decision Making  44-year-old male who comes in today complaining of cellulitis to the left upper arm after scratching an itchy bug bite.  Patient denies fevers or chills.  Otherwise feels well.    Problems Addressed:  Cellulitis of left upper extremity: acute illness or injury  Elevated blood pressure reading in office with diagnosis of hypertension: acute illness or injury    Amount and/or Complexity of Data Reviewed  Independent Historian: spouse  Discussion of management or test interpretation with  external provider(s): Discussed with and evaluated the patient with Dr. Barreto who agrees with assessment and plan.    Risk  OTC drugs.  Prescription drug management.  Risk Details: Patient has known history of elevated blood pressure has been out of his medications and has not taken them for at least 10 days.  Patient denies any symptoms of elevated blood pressure here today    Clinical Impression: Cellulitis left upper extremity, elevated blood pressure with known history of hypertension      The differential diagnosis before testing included MRSA, cellulitis, abscess, DVT, which is a medical condition that poses a threat to life/function.    Patient has no risk factors for DVT, patient has centralized wound that is surrounded by erythema this is most likely cellulitis we will cover for MRSA and treat with Bactrim and cephalosporin, patient was also given a short refill of his antihypertensives and needs to be reevaluated by his PCP this week.  Discussed with patient when he should return to the emergency room            Disposition and Plan     Clinical Impression:  1. Cellulitis of left upper extremity    2. Elevated blood pressure reading in office with diagnosis of hypertension         Disposition:  Discharge  8/10/2024 10:54 pm    Follow-up:  Heladio Craig MD  [id number]  [facility] [address]  [phone]    Schedule an appointment as soon as possible for a visit in 2 day(s)  For wound re-check and recheck BP          Medications Prescribed:  Discharge Medication List as of 8/10/2024 10:56 PM        START taking these medications    Details   cefadroxil 500 MG Oral Cap Take 1 capsule (500 mg total) by mouth 2 (two) times daily for 7 days., Normal, Disp-14 capsule, R-0      sulfamethoxazole-trimethoprim -160 MG Oral Tab per tablet Take 1 tablet by mouth 2 (two) times daily for 7 days., Normal, Disp-14 tablet, R-0

## 2024-08-11 NOTE — DISCHARGE INSTRUCTIONS
See your doctor in 48 hours for wound recheck and blood pressure recheck    Please return to the Emergency department/clinic if symptoms worsen.  Follow up with your primary care physician in 1-2 days as needed. Take any medications prescribed to you as instructed and complete any antibiotic prescription you begin.     If the redness starts to streak up the axilla you need to be reevaluated immediately      While taking antibiotics it is recommended that you also take an over the counter probiotics.  If you'd like, you can have 2 servings of yogurt/Kefir daily instead.  Probiotics increase the good bacteria in your gut while the antibiotic fights the bad bacteria that is causing your infection.  The good bacteria in your gut act as part of your immune system.  Taking a probiotic while on antibiotics will decrease the chances of upset stomach and diarrhea, which are common side effects of antibiotics.

## 2024-10-18 ENCOUNTER — OFFICE VISIT (OUTPATIENT)
Dept: FAMILY MEDICINE CLINIC | Facility: CLINIC | Age: 44
End: 2024-10-18
Payer: COMMERCIAL

## 2024-10-18 VITALS
RESPIRATION RATE: 18 BRPM | SYSTOLIC BLOOD PRESSURE: 136 MMHG | DIASTOLIC BLOOD PRESSURE: 84 MMHG | HEART RATE: 84 BPM | BODY MASS INDEX: 31.55 KG/M2 | TEMPERATURE: 98 F | WEIGHT: 213 LBS | HEIGHT: 69 IN | OXYGEN SATURATION: 96 %

## 2024-10-18 DIAGNOSIS — I10 PRIMARY HYPERTENSION: ICD-10-CM

## 2024-10-18 DIAGNOSIS — Z00.00 ANNUAL PHYSICAL EXAM: Primary | ICD-10-CM

## 2024-10-18 DIAGNOSIS — S83.8X2A SPRAIN OF MEDIAL MENISCUS OF LEFT KNEE, INITIAL ENCOUNTER: ICD-10-CM

## 2024-10-18 DIAGNOSIS — H61.22 EXCESSIVE CERUMEN IN EAR CANAL, LEFT: ICD-10-CM

## 2024-10-18 DIAGNOSIS — Z12.5 SCREENING FOR PROSTATE CANCER: ICD-10-CM

## 2024-10-18 RX ORDER — LISINOPRIL AND HYDROCHLOROTHIAZIDE 10; 12.5 MG/1; MG/1
1 TABLET ORAL DAILY
Qty: 90 TABLET | Refills: 3 | Status: SHIPPED | OUTPATIENT
Start: 2024-10-18

## 2024-10-19 NOTE — PROGRESS NOTES
HPI:    Gal Cardenas is a 44 year old male who presents for Physical (Left knee pain for about a month from running. )     Patient presents for recheck of his hypertension. Pt has been taking medications as instructed, no medication side effects, home BP monitoring in the range of 120's systolic and 70's diastolic.     Past History:   He  has a past medical history of Allergic rhinitis (2002), Elevated bilirubin (07/30/2010), Hand pain (07/15/2010), Knee problem (01/01/2008), Lipid screening (07/20/2010), and Seasonal allergies.   He  has a past surgical history that includes vasectomy (2018).   His family history includes Diabetes in his maternal grandfather, mother, and paternal grandfather; Heart Attack in his paternal grandfather; Heart Disease in his paternal grandfather.   He  reports that he has never smoked. He has never used smokeless tobacco. He reports that he does not drink alcohol and does not use drugs.     He is not on any long-term medications.   He is allergic to pcn [penicillins] and amoxicillin.     Current Outpatient Medications on File Prior to Visit   Medication Sig    Multiple Vitamin (ONE-A-DAY MENS) Oral Tab Take by mouth.    Fish Oil-Cholecalciferol (OMEGA-3 FISH OIL/VITAMIN D3 OR) Take by mouth.    Ascorbic Acid ([employer] VITAMIN C OR) Take by mouth.     No current facility-administered medications on file prior to visit.         REVIEW OF SYSTEMS:   Patient denies shortness of breath, denies chest pain and denies any recent fevers or chills.    Patient reports no urinary complaints and denies headaches or visual disturbances.   Patient denies any abdominal pain at this time. Patient has no new skin lesions.  Patient reports no acute back pain and reports no dizziness or headaches.   Patient reports no visual disturbances and reports hearing has been about the same.   Patient reports no recent injury or trauma.               EXAM:    /84   Pulse 84   Temp 97.9 °F (36.6 °C)   Resp  18   Ht 5' 9\" (1.753 m)   Wt 213 lb (96.6 kg)   SpO2 96%   BMI 31.45 kg/m²  Estimated body mass index is 31.45 kg/m² as calculated from the following:    Height as of this encounter: 5' 9\" (1.753 m).    Weight as of this encounter: 213 lb (96.6 kg).    General Appearance:  Alert, cooperative, no distress, appears stated age   Head:  Normocephalic, without obvious abnormality, atraumatic   Eyes:  conjunctiva/cornea is not erythematous.        Nose: No nasal drainage.    Throat: No erythema    Neck: Supple, symmetrical, trachea midline, and normal ROM  thyroid: no obvious nodules   Back:   Symmetric, no curvature, ROM normal, no CVA tenderness   Lungs:   Clear to auscultation bilaterally, respirations unlabored   Chest Wall:  No tenderness or deformity   Heart:  Regular rate and rhythm, S1, S2 normal, no murmur,   Abdomen:   Soft, non-tender, bowel sounds active. No hernia.    Genitalia:     Rectal:     Extremities: Extremities normal, atraumatic, no cyanosis or edema   Pulses: 2+ and symmetric   Skin: Skin color, texture, turgor normal, no new rashes    Lymph nodes: No obvious cervical adenopathy.    Neurologic and psych: Normal speech, Alert and oriented x 3.   Normal mood, normal insight and judgment.                    ASSESSMENT AND PLAN:     1. Annual physical exam  -wellness visit, labs place, will refer for c-scope  - Surgery Referral - In Network    2. Screening for prostate cancer    - PSA Total, Screen; Future    3. Primary hypertension  -stable, CPM  - CBC With Differential With Platelet; Future  - Comp Metabolic Panel (14); Future  - Lipid Panel; Future  - TSH W Reflex To Free T4; Future  - lisinopril-hydroCHLOROthiazide 10-12.5 MG Oral Tab; Take 1 tablet by mouth daily.  Dispense: 90 tablet; Refill: 3    4. Sprain of medial meniscus of left knee, initial encounter  -will refer to ORTHO  - Ortho Referral - In Network    5. Excessive cerumen in ear canal, left  -debrox.     Follow up in 1 year, sooner  prn.

## 2024-11-04 ENCOUNTER — LAB ENCOUNTER (OUTPATIENT)
Dept: LAB | Age: 44
End: 2024-11-04
Attending: FAMILY MEDICINE
Payer: COMMERCIAL

## 2024-11-04 ENCOUNTER — TELEPHONE (OUTPATIENT)
Dept: ORTHOPEDICS CLINIC | Facility: CLINIC | Age: 44
End: 2024-11-04

## 2024-11-04 DIAGNOSIS — Z12.5 SCREENING FOR PROSTATE CANCER: ICD-10-CM

## 2024-11-04 DIAGNOSIS — I10 PRIMARY HYPERTENSION: ICD-10-CM

## 2024-11-04 DIAGNOSIS — M25.562 LEFT KNEE PAIN, UNSPECIFIED CHRONICITY: Primary | ICD-10-CM

## 2024-11-04 LAB
ALBUMIN SERPL-MCNC: 4.2 G/DL (ref 3.2–4.8)
ALBUMIN/GLOB SERPL: 1.5 {RATIO} (ref 1–2)
ALP LIVER SERPL-CCNC: 65 U/L
ALT SERPL-CCNC: 31 U/L
ANION GAP SERPL CALC-SCNC: 1 MMOL/L (ref 0–18)
AST SERPL-CCNC: 29 U/L (ref ?–34)
BASOPHILS # BLD AUTO: 0.05 X10(3) UL (ref 0–0.2)
BASOPHILS NFR BLD AUTO: 1 %
BILIRUB SERPL-MCNC: 1.5 MG/DL (ref 0.3–1.2)
BUN BLD-MCNC: 14 MG/DL (ref 9–23)
CALCIUM BLD-MCNC: 10 MG/DL (ref 8.7–10.4)
CHLORIDE SERPL-SCNC: 108 MMOL/L (ref 98–112)
CHOLEST SERPL-MCNC: 164 MG/DL (ref ?–200)
CO2 SERPL-SCNC: 30 MMOL/L (ref 21–32)
COMPLEXED PSA SERPL-MCNC: 2.52 NG/ML (ref ?–4)
CREAT BLD-MCNC: 1.04 MG/DL
EGFRCR SERPLBLD CKD-EPI 2021: 90 ML/MIN/1.73M2 (ref 60–?)
EOSINOPHIL # BLD AUTO: 0.12 X10(3) UL (ref 0–0.7)
EOSINOPHIL NFR BLD AUTO: 2.5 %
ERYTHROCYTE [DISTWIDTH] IN BLOOD BY AUTOMATED COUNT: 12.5 %
FASTING PATIENT LIPID ANSWER: YES
FASTING STATUS PATIENT QL REPORTED: YES
GLOBULIN PLAS-MCNC: 2.8 G/DL (ref 2–3.5)
GLUCOSE BLD-MCNC: 98 MG/DL (ref 70–99)
HCT VFR BLD AUTO: 46 %
HDLC SERPL-MCNC: 38 MG/DL (ref 40–59)
HGB BLD-MCNC: 15.9 G/DL
IMM GRANULOCYTES # BLD AUTO: 0.01 X10(3) UL (ref 0–1)
IMM GRANULOCYTES NFR BLD: 0.2 %
LDLC SERPL CALC-MCNC: 112 MG/DL (ref ?–100)
LYMPHOCYTES # BLD AUTO: 1.69 X10(3) UL (ref 1–4)
LYMPHOCYTES NFR BLD AUTO: 34.7 %
MCH RBC QN AUTO: 30.6 PG (ref 26–34)
MCHC RBC AUTO-ENTMCNC: 34.6 G/DL (ref 31–37)
MCV RBC AUTO: 88.5 FL
MONOCYTES # BLD AUTO: 0.36 X10(3) UL (ref 0.1–1)
MONOCYTES NFR BLD AUTO: 7.4 %
NEUTROPHILS # BLD AUTO: 2.64 X10 (3) UL (ref 1.5–7.7)
NEUTROPHILS # BLD AUTO: 2.64 X10(3) UL (ref 1.5–7.7)
NEUTROPHILS NFR BLD AUTO: 54.2 %
NONHDLC SERPL-MCNC: 126 MG/DL (ref ?–130)
OSMOLALITY SERPL CALC.SUM OF ELEC: 288 MOSM/KG (ref 275–295)
PLATELET # BLD AUTO: 206 10(3)UL (ref 150–450)
POTASSIUM SERPL-SCNC: 4.2 MMOL/L (ref 3.5–5.1)
PROT SERPL-MCNC: 7 G/DL (ref 5.7–8.2)
RBC # BLD AUTO: 5.2 X10(6)UL
SODIUM SERPL-SCNC: 139 MMOL/L (ref 136–145)
TRIGL SERPL-MCNC: 71 MG/DL (ref 30–149)
TSI SER-ACNC: 2.42 UIU/ML (ref 0.55–4.78)
VLDLC SERPL CALC-MCNC: 12 MG/DL (ref 0–30)
WBC # BLD AUTO: 4.9 X10(3) UL (ref 4–11)

## 2024-11-04 PROCEDURE — 85025 COMPLETE CBC W/AUTO DIFF WBC: CPT

## 2024-11-04 PROCEDURE — 36415 COLL VENOUS BLD VENIPUNCTURE: CPT

## 2024-11-04 PROCEDURE — 80061 LIPID PANEL: CPT

## 2024-11-04 PROCEDURE — 80053 COMPREHEN METABOLIC PANEL: CPT

## 2024-11-04 PROCEDURE — 84443 ASSAY THYROID STIM HORMONE: CPT

## 2024-11-04 NOTE — TELEPHONE ENCOUNTER
Patient scheduled for left knee pain  - no previous images.  Patient notified to come in 15 minutes prior to appointment. Please order x-rays if appropriate     Future Appointments   Date Time Provider Department Center   11/13/2024  2:40 PM Maico Barker DO [facility] [address]

## 2024-11-05 ENCOUNTER — OFFICE VISIT (OUTPATIENT)
Dept: FAMILY MEDICINE CLINIC | Facility: CLINIC | Age: 44
End: 2024-11-05
Payer: COMMERCIAL

## 2024-11-05 DIAGNOSIS — H61.22 IMPACTED CERUMEN OF LEFT EAR: Primary | ICD-10-CM

## 2024-11-05 PROCEDURE — 69209 REMOVE IMPACTED EAR WAX UNI: CPT | Performed by: NURSE PRACTITIONER

## 2024-11-05 NOTE — PROGRESS NOTES
Chief Complaint   Patient presents with    Ear Problem     Ear cleaning       HPI:     Gal Cardenas is a 44 year old male presents for ear wax removal. Patient denies any ear pain. States was told at his physical last week to use debrox and return for ear cleaning    ROS:   Pertinent positives cerumen to left TM  All other systems reviewed and are negative.    Physical Exam:    Left EAC obstructed with cerumen. Cerumen removed via irrigation. EAC clear. TM's gray.     MDM/Assessment/Plan:       Diagnosis:    ICD-10-CM    1. Impacted cerumen of left ear  H61.22           Patient tolerated ear irrigation well.  To follow up as needed.     Hpi Title: Evaluation of Skin Lesions

## 2024-11-13 ENCOUNTER — HOSPITAL ENCOUNTER (OUTPATIENT)
Dept: GENERAL RADIOLOGY | Age: 44
Discharge: HOME | End: 2024-11-13
Attending: FAMILY MEDICINE
Payer: COMMERCIAL

## 2024-11-13 ENCOUNTER — OFFICE VISIT (OUTPATIENT)
Facility: CLINIC | Age: 44
End: 2024-11-13
Payer: COMMERCIAL

## 2024-11-13 VITALS — WEIGHT: 213 LBS | HEIGHT: 69 IN | BODY MASS INDEX: 31.55 KG/M2

## 2024-11-13 DIAGNOSIS — M25.562 LEFT KNEE PAIN, UNSPECIFIED CHRONICITY: ICD-10-CM

## 2024-11-13 DIAGNOSIS — M23.92 DERANGEMENT OF LEFT KNEE: Primary | ICD-10-CM

## 2024-11-13 PROCEDURE — 3008F BODY MASS INDEX DOCD: CPT | Performed by: FAMILY MEDICINE

## 2024-11-13 PROCEDURE — 99204 OFFICE O/P NEW MOD 45 MIN: CPT | Performed by: FAMILY MEDICINE

## 2024-11-13 PROCEDURE — 73564 X-RAY EXAM KNEE 4 OR MORE: CPT | Performed by: FAMILY MEDICINE

## 2024-11-13 NOTE — H&P
Sports Medicine Clinic Note     Subjective:    Chief Complaint   Patient presents with    Knee Pain     New patient left knee pain;  Onset - beginning of Aug, while running , ok with walking tried running on treadmill with some discomfort      Chief Complaint: Left knee pain.    Date of Injury: August 2024    History: This is a pleasant 44 year old male who presents with pain in the left knee. The patient describes an incident wherein they were running and began feeling a sharp pain at the medial aspect of the knee during the run. The incident was followed by immediate pain and swelling. Denies any history of prior knee injuries or surgeries. No reported numbness, tingling, or other neurological symptoms. The patient does not report mechanical symptoms including catching and locking of the affected knee. Thought symptoms would chika but have been limiting him from exercising for several months.    Objective:    Left Knee Examination:    Inspection: Non-antalgic gait. No appreciable muscle atrophy. No warmth, erythema. Trace effusion.  Palpation: Tenderness over the medial joint line. No tenderness over the lateral joint line, patella, with patellofemoral compression, over the tibial tubercle, suprapatellar pouch, pes anserine bursa, popliteal fossa, distal IT band, or other compartments of the leg.   Range of Motion: Flexion from 0-135 with pain at end range. Extensor mechanism intact and without palpable defects. No audible crepitus  Neurovascular: SILT S / S / SP / DP / Tib nerve distributions. Fires quad / hamstrings / GSC / TA / EHL. 2+ DP & PT pulses, brisk cap refill  Special: Normal patellar tracking. No laxity/opening to varus/valgus force at both 30 and 0 degrees. Negative Lachman. Negative Posterior Drawer. Positive Za.    Diagnostic Tests:    Radiographs of the affected knee were personally reviewed in the office and revealed no apparent fracture, dislocation, or significant joint space narrowing.  Patellar alignment and overall bony architecture appears normal.    Assessment:    Suspected Internal Derangement of Left Knee  Differential Diagnosis: Ligamentous injury, Meniscal tear, Chondral injury    Plan:    Imaging: Order MRI of the knee to evaluate chondral surfaces, ligamentous structures, and rule out internal derangement.  Medication: Prescribe NSAIDs for pain management, can be combined with Acetaminophen as needed for pain control.  Activity Recommendations: Activity is advised to be limited to tolerance, avoiding excessive walking or standing for prolonged periods. Advise the patient to avoid activities that exacerbate pain.  Bracing: Consider knee brace for stability and support during activities.    Follow-up: Tentative follow-up is planned following MRI to reassess the treatment plan and adapt as necessary. Instruct the patient to return earlier if symptoms worsen or new symptoms develop.      Maico Barker DO, CAQSM   Primary Care Sports Medicine

## 2024-12-09 PROBLEM — Z12.11 SPECIAL SCREENING FOR MALIGNANT NEOPLASMS, COLON: Status: ACTIVE | Noted: 2024-12-09

## 2025-01-14 ENCOUNTER — ANESTHESIA (OUTPATIENT)
Dept: ENDOSCOPY | Facility: HOSPITAL | Age: 45
End: 2025-01-14
Payer: COMMERCIAL

## 2025-01-14 ENCOUNTER — ANESTHESIA EVENT (OUTPATIENT)
Dept: ENDOSCOPY | Facility: HOSPITAL | Age: 45
End: 2025-01-14
Payer: COMMERCIAL

## 2025-01-14 ENCOUNTER — HOSPITAL ENCOUNTER (OUTPATIENT)
Facility: HOSPITAL | Age: 45
Setting detail: HOSPITAL OUTPATIENT SURGERY
Discharge: HOME | End: 2025-01-14
Attending: INTERNAL MEDICINE | Admitting: INTERNAL MEDICINE
Payer: COMMERCIAL

## 2025-01-14 VITALS
WEIGHT: 205 LBS | TEMPERATURE: 98 F | RESPIRATION RATE: 18 BRPM | HEIGHT: 69 IN | SYSTOLIC BLOOD PRESSURE: 132 MMHG | HEART RATE: 77 BPM | BODY MASS INDEX: 30.36 KG/M2 | DIASTOLIC BLOOD PRESSURE: 81 MMHG | OXYGEN SATURATION: 98 %

## 2025-01-14 DIAGNOSIS — Z12.11 COLON CANCER SCREENING: ICD-10-CM

## 2025-01-14 PROCEDURE — 0DBH8ZX EXCISION OF CECUM, VIA NATURAL OR ARTIFICIAL OPENING ENDOSCOPIC, DIAGNOSTIC: ICD-10-PCS | Performed by: INTERNAL MEDICINE

## 2025-01-14 PROCEDURE — 88305 TISSUE EXAM BY PATHOLOGIST: CPT | Performed by: INTERNAL MEDICINE

## 2025-01-14 RX ORDER — LIDOCAINE HYDROCHLORIDE 10 MG/ML
INJECTION, SOLUTION EPIDURAL; INFILTRATION; INTRACAUDAL; PERINEURAL AS NEEDED
Status: DISCONTINUED | OUTPATIENT
Start: 2025-01-14 | End: 2025-01-14 | Stop reason: SURG

## 2025-01-14 RX ORDER — SODIUM CHLORIDE, SODIUM LACTATE, POTASSIUM CHLORIDE, CALCIUM CHLORIDE 600; 310; 30; 20 MG/100ML; MG/100ML; MG/100ML; MG/100ML
INJECTION, SOLUTION INTRAVENOUS CONTINUOUS
Status: DISCONTINUED | OUTPATIENT
Start: 2025-01-14 | End: 2025-01-14

## 2025-01-14 RX ORDER — GLYCOPYRROLATE 0.2 MG/ML
INJECTION, SOLUTION INTRAMUSCULAR; INTRAVENOUS AS NEEDED
Status: DISCONTINUED | OUTPATIENT
Start: 2025-01-14 | End: 2025-01-14 | Stop reason: SURG

## 2025-01-14 RX ADMIN — LIDOCAINE HYDROCHLORIDE 50 MG: 10 INJECTION, SOLUTION EPIDURAL; INFILTRATION; INTRACAUDAL; PERINEURAL at 11:26:00

## 2025-01-14 RX ADMIN — SODIUM CHLORIDE, SODIUM LACTATE, POTASSIUM CHLORIDE, CALCIUM CHLORIDE: 600; 310; 30; 20 INJECTION, SOLUTION INTRAVENOUS at 12:13:00

## 2025-01-14 RX ADMIN — GLYCOPYRROLATE 0.2 MG: 0.2 INJECTION, SOLUTION INTRAMUSCULAR; INTRAVENOUS at 11:48:00

## 2025-01-14 NOTE — ANESTHESIA PREPROCEDURE EVALUATION
PRE-OP EVALUATION    Patient Name: Gal Cardenas    Admit Diagnosis: Colon cancer screening [Z12.11]    Pre-op Diagnosis: Colon cancer screening [Z12.11]    COLONOSCOPY    Anesthesia Procedure: COLONOSCOPY    Surgeons and Role:     * Marco A May MD - Primary    Pre-op vitals reviewed.  Temp: 98.1 °F (36.7 °C)  Pulse: 82  Resp: 20  BP: 159/100  SpO2: 98 %  Body mass index is 30.27 kg/m².    Current medications reviewed.  Hospital Medications:   lactated ringers infusion   Intravenous Continuous       Outpatient Medications:   Prescriptions Prior to Admission[1]    Allergies: Amoxicillin and Pcn [penicillins]      Anesthesia Evaluation    Patient summary reviewed.    Anesthetic Complications  (-) history of anesthetic complications         GI/Hepatic/Renal    Negative GI/hepatic/renal ROS.                             Cardiovascular    Negative cardiovascular ROS.    Exercise tolerance: good     MET: >4      (+) hypertension                                     Endo/Other    Negative endo/other ROS.                              Pulmonary    Negative pulmonary ROS.                       Neuro/Psych    Negative neuro/psych ROS.                                  Past Surgical History:   Procedure Laterality Date    Vasectomy  2018     Social History     Socioeconomic History    Marital status:    Tobacco Use    Smoking status: Never    Smokeless tobacco: Never   Vaping Use    Vaping status: Never Used   Substance and Sexual Activity    Alcohol use: No    Drug use: No   Other Topics Concern    Caffeine Concern Yes    Stress Concern No    Weight Concern No    Special Diet No    Exercise Yes    Seat Belt Yes     History   Drug Use No     Available pre-op labs reviewed.  Lab Results   Component Value Date    WBC 4.9 11/04/2024    RBC 5.20 11/04/2024    HGB 15.9 11/04/2024    HCT 46.0 11/04/2024    MCV 88.5 11/04/2024    MCH 30.6 11/04/2024    MCHC 34.6 11/04/2024    RDW 12.5 11/04/2024    .0 11/04/2024      Lab Results   Component Value Date     11/04/2024    K 4.2 11/04/2024     11/04/2024    CO2 30.0 11/04/2024    BUN 14 11/04/2024    CREATSERUM 1.04 11/04/2024    GLU 98 11/04/2024    CA 10.0 11/04/2024            Airway      Mallampati: I  Mouth opening: 3 FB  TM distance: 4 - 6 cm  Neck ROM: full Cardiovascular    Cardiovascular exam normal.  Rhythm: regular  Rate: normal  (-) murmur   Dental             Pulmonary    Pulmonary exam normal.  Breath sounds clear to auscultation bilaterally.               Other findings              ASA: 2   Plan: MAC  NPO status verified and patient meets guidelines.    Post-procedure pain management plan discussed with surgeon and patient.    Comment: Discussed MAC anesthesia with patient.  Discussed risks including but not limited to perioperative awareness, conversion to general anesthesia and risks associated with GA.  PT understands and agrees to proceed.    Plan/risks discussed with: patient                Present on Admission:  **None**             [1]   Medications Prior to Admission   Medication Sig Dispense Refill Last Dose/Taking    lisinopril-hydroCHLOROthiazide 10-12.5 MG Oral Tab Take 1 tablet by mouth daily. 90 tablet 3 1/14/2025 Morning    Multiple Vitamin (ONE-A-DAY MENS) Oral Tab Take by mouth.   Taking    Fish Oil-Cholecalciferol (OMEGA-3 FISH OIL/VITAMIN D3 OR) Take by mouth.   1/12/2025    Ascorbic Acid ([facility] VITAMIN C OR) Take by mouth.   Taking    PEG 3350-KCl-Na Bicarb-NaCl 420 g Oral Recon Soln Take as directed by physician. 4000 mL 0

## 2025-01-14 NOTE — ANESTHESIA POSTPROCEDURE EVALUATION
[facility]    Gal Cardenas Patient Status:  Hospital Outpatient Surgery   Age/Gender 44 year old male MRN [id number]   Location [facility] ENDOSCOPY PAIN CENTER Attending Marco A May MD   Hosp Day # 0 PCP Heladio Craig MD       Anesthesia Post-op Note    COLONOSCOPY with single balloon and forcep polypectomy    Procedure Summary       Date: 01/14/25 Room / Location:  ENDOSCOPY 02 /  ENDOSCOPY    Anesthesia Start: 1122 Anesthesia Stop: 1213    Procedure: COLONOSCOPY with single balloon and forcep polypectomy Diagnosis:       Colon cancer screening      (polyp)    Surgeons: Marco A May MD Anesthesiologist: Kirby Saucedo MD    Anesthesia Type: MAC ASA Status: 2            Anesthesia Type: MAC    Vitals Value Taken Time   /77 01/14/25 1218   Temp 98 01/14/25 1219   Pulse 91 01/14/25 1219   Resp 18 01/14/25 1219   SpO2 88 % 01/14/25 1219   Vitals shown include unfiled device data.        Patient Location: Endoscopy    Anesthesia Type: MAC    Airway Patency: patent and extubated    Postop Pain Control: adequate    Mental Status: mildly sedated but able to meaningfully participate in the post-anesthesia evaluation    Nausea/Vomiting: none    Cardiopulmonary/Hydration status: stable euvolemic    Complications: no apparent anesthesia related complications    Postop vital signs: stable    Dental Exam: Unchanged from Preop    Patient to be discharged from PACU when criteria met.

## 2025-01-14 NOTE — OPERATIVE REPORT
Gal Cardenas Patient Status:  Hospital Outpatient Surgery    1980 MRN [id number]   [facility] ENDOSCOPY PAIN CENTER Attending Marco A May MD   Date 2025 PCP Heladio Craig MD     PREOPERATIVE DIAGNOSIS/INDICATION: Screening, prior incomplete colon  POSTOPERTATIVE DIAGNOSIS: polyp  PROCEDURE PERFORMED: COLONOSCOPY with biopsy  SEDATION: MAC sedation provided by General Anesthesia    TIME OUT WAS PERFORMED    INFORMED CONSENT: Risks, benefits and alternatives to the procedure were explained to the patient including but not limited to bleeding, infection, perforation, adverse drug reactions, pancreatitis and the need for hospitalization and surgery if this occurs, the patient understands and agrees to procedure.  PROCEDURE DESCRIPTION: After careful digital rectal examination a enteroscope with a single balloon overtube was introduced into the patients rectum, advanced pass the recto sigmoid junction, into the descending colon, splenic flexure, transverse colon, hepatic flexure, ascending colon, cecum, confirmed by landmarks, including the appendiceal orifice and ileocecal valve. Careful examination of the above described areas was performed on withdrawal of the endoscope. Retroflexion was performed on the rectum. The patient tolerated the procedure well, there were no immediate complication immediately following the procedure, and the patient was transferred to recovery in stable condition.  QUALITY OF PREPARATION: [place] Bowel Preparation Scale:            -      Right colon 3, Transverse colon 3, Left colon 3   FINDINGS/THERAPEUTICS:  COLON: 2 mm sessile cecal polyp s/p excisional biopsy with cold forceps  RECOMMENDATIONS:   Post Colonoscopy/polypectomy precautions, watch for bleeding, infection, perforation, adverse drug reactions   Follow biopsy  Repeat colonoscopy in 5-7 years.    Marco A May MD  2025  12:06 PM

## 2025-01-14 NOTE — H&P
[facility]  Pre-op H and P    Gal Cardenas Patient Status:  Hospital Outpatient Surgery    1980 MRN [id number]   Location [facility] ENDOSCOPY PAIN CENTER Attending Marco A May MD   Date 2025 PCP Heladio Craig MD     CC: Screening    History of Present Illness:  Gal Cardenas is a a(n) 44 year old male. Screening    History:  Past Medical History:    Allergic rhinitis    Bloating    Decorative tattoo    Elevated bilirubin    on labs    Hand pain    intermittent, job-related    High blood pressure    Knee problem    L knee infection, hospitalized [facility]    Lipid screening    Seasonal allergies    Visual impairment    glasses as needed     Past Surgical History:   Procedure Laterality Date    Vasectomy  2018     Family History   Problem Relation Age of Onset    Diabetes Mother         borderline DM II-diet controlled    Diabetes Maternal Grandfather     Heart Disease Paternal Grandfather     Heart Attack Paternal Grandfather     Diabetes Paternal Grandfather       reports that he has never smoked. He has never used smokeless tobacco. He reports that he does not drink alcohol and does not use drugs.    Allergies:  Allergies[1]    Medications:  No current facility-administered medications for this encounter.    Physical Exam:    Height 5' 9\" (1.753 m), weight 205 lb (93 kg).    General: Appears alert, oriented x3 and in no acute distress.  CV: Normal rate   Lungs: Normal effort   Skin: Warm and dry.  Laboratory Data:       Imaging:      Assessment/Plan/Procedure:  Patient Active Problem List   Diagnosis    Seasonal allergies    Blood pressure elevated without history of HTN    Primary hypertension    Sprain of medial meniscus of left knee    Special screening for malignant neoplasms, colon       Screening    Plan:  Colonoscopy    Marco A May MD  2025  9:58 AM       [1]   Allergies  Allergen Reactions    Amoxicillin RASH    Pcn [Penicillins] RASH

## 2025-01-14 NOTE — DISCHARGE INSTRUCTIONS
Home Care Instructions for Colonoscopy with Sedation    Diet:  - Resume your regular diet   - Start with light meals to minimize bloating.  - Do not drink alcohol today.    Medication:  - If you have questions about resuming your normal medications, please contact your Primary Care Physician.    Activities:  - Take it easy today. Do not return to work today.  - Do not drive today.  - Do not operate any machinery today (including kitchen equipment).    Colonoscopy:  - You may notice some rectal \"spotting\" (a little blood on the toilet tissue) for a day or two after the exam. This is normal.  - If you experience any rectal bleeding (not spotting), persistent tenderness or sharp severe abdominal pains, oral temperature over 100 degrees Fahrenheit, light-headedness or dizziness, or any other problems, contact your doctor.    **If unable to reach your doctor, please go to the [facility] Emergency Room**    - Your referring physician will receive a full report of your examination.  - If you do not hear from your doctor's office within two weeks of your biopsy, please call them for your results.

## 2025-01-16 NOTE — PROGRESS NOTES
Date: 1/15/2025    To: Gal Cardenas  : 1980    I hope this letter finds you doing well.  I am writing to inform you of the following:       The biopsies obtained from your recent colonoscopy indicate that the polyp was adenomatous which, although benign (no evidence of cancer), are considered potentially pre-cancerous if they are left alone for many years.  It was removed at the time of your colonoscopy.         I am advising you to undergo repeat  colonoscopy in 7 years. We will send you a reminder card when the time of your procedure is near.      Please call the office at [phone] if there are any questions.    Regards,    Marco A May M.D.

## 2025-01-22 ENCOUNTER — OFFICE VISIT (OUTPATIENT)
Facility: CLINIC | Age: 45
End: 2025-01-22
Payer: COMMERCIAL

## 2025-01-22 DIAGNOSIS — M67.40 GANGLION CYST: ICD-10-CM

## 2025-01-22 DIAGNOSIS — S83.8X2D INJURY OF MENISCUS OF LEFT KNEE, SUBSEQUENT ENCOUNTER: Primary | ICD-10-CM

## 2025-01-22 DIAGNOSIS — M94.262 CHONDROMALACIA, KNEE, LEFT: ICD-10-CM

## 2025-01-22 PROCEDURE — 99214 OFFICE O/P EST MOD 30 MIN: CPT | Performed by: FAMILY MEDICINE

## 2025-01-22 NOTE — PROGRESS NOTES
Sports Medicine Clinic Note    Subjective:    Current Visit Date: 1/22/2025    Chief Complaint: Follow-up for left knee MRI review.    Date of Injury: August 2024.    History: This is a 44-year-old male returning for follow-up after MRI of the left knee. The patient states that his symptoms have resolved , and he is currently pain-free. Previously reported medial knee pain occurred during running and was initially triggered by training for a race, which he ultimately did not participate in due to discomfort. He denies any ongoing pain, swelling, catching, locking, or other mechanical symptoms.    Objective:    Left Knee Examination:    Inspection: Non-antalgic gait. No muscle atrophy, erythema, or warmth. No effusion.  Palpation: No tenderness over the medial or lateral joint lines, patella, tibial tubercle, or other compartments.  Range of Motion: Full range of motion from 0-135 degrees without pain. Extensor mechanism intact and without defects.  Neurovascular: Sensation intact to light touch in superficial and deep peroneal, tibial, and sural nerve distributions. Strength 5/5 throughout quadriceps, hamstrings, and associated muscle groups. Well perfused distally.    Diagnostic Tests:    MRI of the left knee reviewed and demonstrates:  Tear of the body and posterior horn of the medial meniscus extending to the inferior articular surface, with a flipped portion into the inferior gutter.  Multilocular ganglion cyst located posterior to the medial tibial condyle, likely originating from the posterior horn medial meniscus.  Mild cartilage degeneration at the patellofemoral joint and lateral tibiofemoral joint space, with partial-thickness fissures and marginal osteophytes.  Small joint effusion, popliteal cyst (2.6 cm), and soft tissue edema.  No ligamentous injury or other significant abnormalities.    Assessment:    Tear of the body and posterior horn of the left medial meniscus with a flipped portion into the  inferior gutter.  Multilocular ganglion cyst posterior to the medial tibial condyle, likely associated with the meniscus.  Patellofemoral and lateral tibiofemoral joint degenerative changes.  Resolving patellofemoral impingement with associated edema in Hoffa's and suprapatellar fat pads.    Plan:    Additional Workup: None indicated at this time given the resolution of symptoms.  Therapy: None indicated. The patient is advised to continue activity as tolerated.  Medications: None required. Continue NSAIDs or acetaminophen only as needed for occasional soreness.  Bracing/Casting: None indicated at this time.  Procedures: None indicated. The patient declined corticosteroid injection as symptoms have resolved. Surgery would not be indicated in the absence of pain and/or mechanical symptoms in my opinion.  Activity Recommendations: Advised to gradually return to running and other activities while monitoring for recurrence of symptoms. Avoid excessive repetitive high-impact activities initially.  Patient Education: Explained that the medial meniscus tear extends into the white zone, which has limited healing potential. However, given the absence of pain or mechanical symptoms, conservative management is appropriate. Reassurance provided that continued monitoring is reasonable.    Follow-Up: Tentatively scheduled for follow-up only if symptoms recur or worsen.      Maico Barker DO, CAQSM   Primary Care Sports Medicine

## 2025-06-20 ENCOUNTER — OFFICE VISIT (OUTPATIENT)
Dept: FAMILY MEDICINE CLINIC | Facility: CLINIC | Age: 45
End: 2025-06-20
Payer: COMMERCIAL

## 2025-06-20 DIAGNOSIS — K40.90 NON-RECURRENT UNILATERAL INGUINAL HERNIA WITHOUT OBSTRUCTION OR GANGRENE: ICD-10-CM

## 2025-06-20 DIAGNOSIS — R10.32 LEFT INGUINAL PAIN: ICD-10-CM

## 2025-06-20 DIAGNOSIS — I10 PRIMARY HYPERTENSION: Primary | ICD-10-CM

## 2025-06-20 PROCEDURE — 99214 OFFICE O/P EST MOD 30 MIN: CPT | Performed by: FAMILY MEDICINE

## 2025-06-20 PROCEDURE — 3075F SYST BP GE 130 - 139MM HG: CPT | Performed by: FAMILY MEDICINE

## 2025-06-20 PROCEDURE — 3008F BODY MASS INDEX DOCD: CPT | Performed by: FAMILY MEDICINE

## 2025-06-20 PROCEDURE — 3079F DIAST BP 80-89 MM HG: CPT | Performed by: FAMILY MEDICINE

## 2025-06-20 NOTE — PROGRESS NOTES
The following individual(s) verbally consented to be recorded using ambient AI listening technology and understand that they can each withdraw their consent to this listening technology at any point by asking the clinician to turn off or pause the recording:    Patient name: Gal CHYNA Cardenas

## 2025-06-23 VITALS
WEIGHT: 218 LBS | OXYGEN SATURATION: 98 % | HEIGHT: 69 IN | DIASTOLIC BLOOD PRESSURE: 89 MMHG | SYSTOLIC BLOOD PRESSURE: 138 MMHG | HEART RATE: 77 BPM | RESPIRATION RATE: 12 BRPM | BODY MASS INDEX: 32.29 KG/M2

## 2025-06-23 NOTE — PROGRESS NOTES
HPI:    Gal Cardenas is a 45 year old male who presents for Hernia (Believes he has a hernia near pelvic/pubic area. )     History of Present Illness  Gal Cardenas is a 45 year old male who presents with concerns of a possible hernia.    He has noticed a swelling in the groin area, specifically in the pubis region, which has become more prominent over the last month and a half. The swelling is not painful but is noticeable during activities like sit-ups. The swelling sometimes varies with his diet, becoming more prominent at times and then subsiding. There is no limitation in physical activities such as lifting, and no pain during these activities.    No acute chest pain or shortness of breath. His blood pressure has been highly elevated, recorded at 130/100, but he does not provide further details on any symptoms related to this.    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 He plans to travel to [place] at the end of June and the first week of July.       Past History:   He  has a past medical history of Allergic rhinitis (2002), Bloating, Decorative tattoo, Elevated bilirubin (07/30/2010), Hand pain (07/15/2010), High blood pressure, Knee problem (01/01/2008), Lipid screening (07/20/2010), Seasonal allergies, and Visual impairment.   He  has a past surgical history that includes vasectomy (2018) and colonoscopy (N/A, 1/14/2025).   His family history includes Diabetes in his maternal grandfather, mother, and paternal grandfather; Heart Attack in his paternal grandfather; Heart Disease in his paternal grandfather.   He  reports that he has never smoked. He has never used smokeless tobacco. He reports that he does not drink alcohol and does not use drugs.     He is not on any long-term medications.   He is allergic to amoxicillin and pcn [penicillins].   Medications Ordered Prior to this Encounter[1]      REVIEW OF SYSTEMS:   Patient denies shortness of breath,  denies chest pain and denies any recent fevers or chills.    Patient reports no urinary complaints and denies headaches or visual disturbances.   Patient denies any abdominal pain at this time. Patient has no new skin lesions.  Patient reports no acute back pain and reports no dizziness or headaches.   Patient reports no visual disturbances and reports hearing has been about the same.   Patient reports no recent injury or trauma.               EXAM:    BP (!) 160/110   Pulse 77   Resp 12   Ht 5' 9\" (1.753 m)   Wt 218 lb (98.9 kg)   SpO2 98%   BMI 32.19 kg/m²  Estimated body mass index is 32.19 kg/m² as calculated from the following:    Height as of this encounter: 5' 9\" (1.753 m).    Weight as of this encounter: 218 lb (98.9 kg).    General Appearance:  Alert, cooperative, no distress, appears stated age   Head:  Normocephalic, without obvious abnormality, atraumatic   Eyes:  conjunctiva/cornea is not erythematous.        Nose: No nasal drainage.    Throat: No erythema    Neck: Supple, symmetrical, trachea midline, and normal ROM  thyroid: no obvious nodules   Back:   Symmetric, no curvature, ROM normal, no CVA tenderness   Lungs:   Clear to auscultation bilaterally, respirations unlabored   Chest Wall:  No tenderness or deformity   Heart:  Regular rate and rhythm, S1, S2 normal, no murmur,   Abdomen:   Soft, non-tender, bowel sounds active. No hernia.    Genitalia:     Rectal:     Extremities: Extremities normal, atraumatic, no cyanosis or edema   Pulses: 2+ and symmetric   Skin: Skin color, texture, turgor normal, no new rashes    Lymph nodes: No obvious cervical adenopathy.    Neurologic and psych: Normal speech, Alert and oriented x 3.   Normal mood, normal insight and judgment.    Large left sided inguinal hernia-stable, reducible, no signs incarceration.       Assessment & Plan  Inguinal hernia  Reports a swelling in the pubic/groin area, more prominent over the last month and a half, noticeable during  activities like sit-ups. Physical examination revealed a prominent bulge, suggesting an inguinal hernia. Surgical intervention is recommended due to the prominence of the hernia. Recovery time is approximately 4-6 weeks, with restrictions on heavy lifting and rigorous activities. A colleague's experience suggests a possible shorter downtime of one week for less intensive activities.  - Refer to a surgeon for evaluation and potential surgical repair  - Coordinate surgical scheduling with travel plans to [place] at the end of June and the first week of July  - Advise on post-surgical recovery, including a 4-6 week downtime for core activities and avoiding heavy lifting    Hypertension  Blood pressure recorded at 130/100 mmHg, which is elevated. The hernia may be contributing to the elevated blood pressure due to potential discomfort or pain.  - Monitor blood pressure closely           ASSESSMENT AND PLAN:         Heladio Craig MD, 6/23/2025, 11:44 AM     Note to patient: The 21st Century Cures Act makes medical notes like these available to patients in the interest of transparency. However, this is a medical document intended as peer to peer communication. It is written in medical language and may contain abbreviations or verbiage that are unfamiliar. It may appear blunt or direct. Medical documents are intended to carry relevant information, facts as evident, and the clinical opinion of the practitioner who signs the document.        [1]   Current Outpatient Medications on File Prior to Visit   Medication Sig    lisinopril-hydroCHLOROthiazide 10-12.5 MG Oral Tab Take 1 tablet by mouth daily.    Multiple Vitamin (ONE-A-DAY MENS) Oral Tab Take by mouth.    Fish Oil-Cholecalciferol (OMEGA-3 FISH OIL/VITAMIN D3 OR) Take by mouth.    Ascorbic Acid ([employer] VITAMIN C OR) Take by mouth.     No current facility-administered medications on file prior to visit.

## 2025-06-24 ENCOUNTER — OFFICE VISIT (OUTPATIENT)
Facility: LOCATION | Age: 45
End: 2025-06-24
Payer: COMMERCIAL

## 2025-06-24 VITALS
OXYGEN SATURATION: 96 % | HEART RATE: 84 BPM | TEMPERATURE: 98 F | SYSTOLIC BLOOD PRESSURE: 159 MMHG | DIASTOLIC BLOOD PRESSURE: 105 MMHG

## 2025-06-24 DIAGNOSIS — K40.90 LEFT INGUINAL HERNIA: Primary | ICD-10-CM

## 2025-06-24 NOTE — H&P
New Patient Visit Note       Active Problems      1. Left inguinal hernia        Chief Complaint   Chief Complaint   Patient presents with    New Patient     NP- Left inguinal hernia, ref by Dr. Craig. Pt has had for a while. Pt reports the lump has gotten larger over the last few months. Pt denies any pain. Pt used to be able to manually reduce the hernia but states he cannot push it back in anymore. Pt has not had any imaging        History of Present Illness   45 year old male who presents for evaluation of left inguinal hernia. He reported that he has noticed a bulge in his groin for about 1 year. He denies any associated pain or erythema. No nausea, vomiting, or diarrhea. For the past 2-3 months he has been unable to push it back in. No history of abdominal surgery, he had a vasectomy on 2019.      Allergies  Gal is allergic to amoxicillin and pcn [penicillins].    Past Medical / Surgical / Social / Family History    The past medical and past surgical history have been reviewed by me today.    Past Medical History[1]  Past Surgical History[2]    The family history and social history have been reviewed by me today.    Family History[3]  Social Hx on file[4]   Medications - Current[5]      Review of Systems  The Review of Systems has been reviewed by me during today.  Review of Systems   Constitutional: Negative.    Respiratory: Negative.     Cardiovascular: Negative.    Gastrointestinal: Negative.        Physical Findings   BP (!) 159/105 (BP Location: Right arm, Patient Position: Sitting, Cuff Size: adult)   Pulse 84   Temp 98.2 °F (36.8 °C) (Temporal)   SpO2 96%   Physical Exam  Constitutional:       Appearance: Normal appearance.   Cardiovascular:      Rate and Rhythm: Normal rate.   Pulmonary:      Effort: Pulmonary effort is normal.   Abdominal:      General: Abdomen is flat. There is no distension.      Palpations: Abdomen is soft.      Tenderness: There is no abdominal tenderness. There is no  guarding or rebound.      Hernia: A hernia is present.      Comments: Large left inguinal hernia, reducible   Neurological:      Mental Status: He is alert.             Assessment/Plan  1. Left inguinal hernia        Gal Cardenas is a 45 year old male referred by Dr. Heladio Craig for evaluation of left inguinal hernia. This is a symptomatic left inguinal hernia. The risks, benefits, and alternatives of surgical intervention were explained to the patient in detail including, but not limited to, bleeding, infection, recurrence, nondiagnostic yield, incorrect diagnosis, prolonged postoperative pain, urinary retention, injury to adjacent organs and structures, injury to the ilioinguinal and iliohypogastric nerve, testicular ischemia leading to ischemic orchitis or testicular atrophy, injury to the vas deferens, as well as potential need for further therapeutic, diagnostic or surgical intervention. The patient voiced understanding. All  pertinent questions were answered to the patient's satisfaction, after whichwilling and informed consent to proceed with surgery was obtained.       No orders of the defined types were placed in this encounter.      Imaging & Referrals   None    Follow Up  No follow-ups on file.    Kristen Morris MD         [1]   Past Medical History:   Allergic rhinitis    Bloating    Decorative tattoo    Elevated bilirubin    on labs    Hand pain    intermittent, job-related    High blood pressure    Knee problem    L knee infection, hospitalized [facility]    Lipid screening    Seasonal allergies    Visual impairment    glasses   [2]   Past Surgical History:  Procedure Laterality Date    Colonoscopy N/A 1/14/2025    Procedure: COLONOSCOPY with single balloon and forcep polypectomy;  Surgeon: Marco A May MD;  Location:  ENDOSCOPY    Vasectomy  2018   [3]   Family History  Problem Relation Age of Onset    Diabetes Mother         borderline DM II-diet controlled    Diabetes  Maternal Grandfather     Heart Disease Paternal Grandfather     Heart Attack Paternal Grandfather     Diabetes Paternal Grandfather    [4]   Social History  Socioeconomic History    Marital status:    Tobacco Use    Smoking status: Never    Smokeless tobacco: Never   Vaping Use    Vaping status: Never Used   Substance and Sexual Activity    Alcohol use: No    Drug use: Never   Other Topics Concern    Caffeine Concern No    Stress Concern No    Weight Concern Yes     Comment: Gained weight the last year    Special Diet No    Exercise No    Seat Belt No   [5] No current outpatient medications on file.

## 2025-06-24 NOTE — H&P (VIEW-ONLY)
New Patient Visit Note       Active Problems      1. Left inguinal hernia        Chief Complaint   Chief Complaint   Patient presents with    New Patient     NP- Left inguinal hernia, ref by Dr. Craig. Pt has had for a while. Pt reports the lump has gotten larger over the last few months. Pt denies any pain. Pt used to be able to manually reduce the hernia but states he cannot push it back in anymore. Pt has not had any imaging        History of Present Illness   45 year old male who presents for evaluation of left inguinal hernia. He reported that he has noticed a bulge in his groin for about 1 year. He denies any associated pain or erythema. No nausea, vomiting, or diarrhea. For the past 2-3 months he has been unable to push it back in. No history of abdominal surgery, he had a vasectomy on 2019.      Allergies  Gal is allergic to amoxicillin and pcn [penicillins].    Past Medical / Surgical / Social / Family History    The past medical and past surgical history have been reviewed by me today.    Past Medical History[1]  Past Surgical History[2]    The family history and social history have been reviewed by me today.    Family History[3]  Social Hx on file[4]   Medications - Current[5]      Review of Systems  The Review of Systems has been reviewed by me during today.  Review of Systems   Constitutional: Negative.    Respiratory: Negative.     Cardiovascular: Negative.    Gastrointestinal: Negative.        Physical Findings   BP (!) 159/105 (BP Location: Right arm, Patient Position: Sitting, Cuff Size: adult)   Pulse 84   Temp 98.2 °F (36.8 °C) (Temporal)   SpO2 96%   Physical Exam  Constitutional:       Appearance: Normal appearance.   Cardiovascular:      Rate and Rhythm: Normal rate.   Pulmonary:      Effort: Pulmonary effort is normal.   Abdominal:      General: Abdomen is flat. There is no distension.      Palpations: Abdomen is soft.      Tenderness: There is no abdominal tenderness. There is no  guarding or rebound.      Hernia: A hernia is present.      Comments: Large left inguinal hernia, reducible   Neurological:      Mental Status: He is alert.             Assessment/Plan  1. Left inguinal hernia        Gal Cardenas is a 45 year old male referred by Dr. Heladio Craig for evaluation of left inguinal hernia. This is a symptomatic left inguinal hernia. The risks, benefits, and alternatives of surgical intervention were explained to the patient in detail including, but not limited to, bleeding, infection, recurrence, nondiagnostic yield, incorrect diagnosis, prolonged postoperative pain, urinary retention, injury to adjacent organs and structures, injury to the ilioinguinal and iliohypogastric nerve, testicular ischemia leading to ischemic orchitis or testicular atrophy, injury to the vas deferens, as well as potential need for further therapeutic, diagnostic or surgical intervention. The patient voiced understanding. All  pertinent questions were answered to the patient's satisfaction, after whichwilling and informed consent to proceed with surgery was obtained.       No orders of the defined types were placed in this encounter.      Imaging & Referrals   None    Follow Up  No follow-ups on file.    Kristen Morris MD         [1]   Past Medical History:   Allergic rhinitis    Bloating    Decorative tattoo    Elevated bilirubin    on labs    Hand pain    intermittent, job-related    High blood pressure    Knee problem    L knee infection, hospitalized [facility]    Lipid screening    Seasonal allergies    Visual impairment    glasses   [2]   Past Surgical History:  Procedure Laterality Date    Colonoscopy N/A 1/14/2025    Procedure: COLONOSCOPY with single balloon and forcep polypectomy;  Surgeon: Marco A May MD;  Location:  ENDOSCOPY    Vasectomy  2018   [3]   Family History  Problem Relation Age of Onset    Diabetes Mother         borderline DM II-diet controlled    Diabetes  Maternal Grandfather     Heart Disease Paternal Grandfather     Heart Attack Paternal Grandfather     Diabetes Paternal Grandfather    [4]   Social History  Socioeconomic History    Marital status:    Tobacco Use    Smoking status: Never    Smokeless tobacco: Never   Vaping Use    Vaping status: Never Used   Substance and Sexual Activity    Alcohol use: No    Drug use: Never   Other Topics Concern    Caffeine Concern No    Stress Concern No    Weight Concern Yes     Comment: Gained weight the last year    Special Diet No    Exercise No    Seat Belt No   [5] No current outpatient medications on file.

## 2025-06-30 ENCOUNTER — TELEPHONE (OUTPATIENT)
Facility: LOCATION | Age: 45
End: 2025-06-30

## 2025-06-30 NOTE — TELEPHONE ENCOUNTER
Status  No Action Required  Reference Number  [id number]  Transaction ID  [id number]  Requested Service does not require preauthorization. We would strongly encourage you to check benefits for this service.  Requesting Provider  Provider Type  Provider  NPI  [id number]  Specialty  Surgery  First Name  PADMA  Last Name  ELISE  Tax ID  [id number]  Address [address]  [address]  Zip Code  [address]  Contact Name  MONTY SANTILLAN  Phone  [phone]  Fax  [phone]  Patient Information  Subscriber Member ID  [id number]  Patient Date of Birth  05/23/1980  Patient Last Name  LUEBBING  Patient First Name  MAGGIE  Relationship to Subscriber  Self  Service Information  Service Type  Surgical  Place of Service  On [facility]  From Date  07/14/2025  To Date  11/10/2025  Quantity Type  Visits  Quantity  1  Level Of Service  Elective  Diagnoses  K40.90 - Unil inguinal hernia w/o obst or gangr not spcf as recur  Procedures Details  61082 - LAP ING HERNIA REPAIR INIT

## 2025-07-01 ENCOUNTER — APPOINTMENT (OUTPATIENT)
Dept: ADMINISTRATIVE | Facility: HOSPITAL | Age: 45
End: 2025-07-01
Payer: COMMERCIAL

## 2025-07-01 ENCOUNTER — TELEPHONE (OUTPATIENT)
Dept: FAMILY MEDICINE CLINIC | Facility: CLINIC | Age: 45
End: 2025-07-01

## 2025-07-01 NOTE — TELEPHONE ENCOUNTER
SX: 07/14/25. Surgeon: Kristen Morris MD   Procedure: Hernia Repair. / LVM with pt to call back and schedule pre op. / Pre op sheet and order in pre op folder.

## 2025-07-02 ENCOUNTER — LAB ENCOUNTER (OUTPATIENT)
Dept: LAB | Age: 45
End: 2025-07-02
Payer: COMMERCIAL

## 2025-07-02 ENCOUNTER — OFFICE VISIT (OUTPATIENT)
Dept: FAMILY MEDICINE CLINIC | Facility: CLINIC | Age: 45
End: 2025-07-02
Payer: COMMERCIAL

## 2025-07-02 VITALS
OXYGEN SATURATION: 97 % | BODY MASS INDEX: 31.84 KG/M2 | HEART RATE: 80 BPM | WEIGHT: 215 LBS | RESPIRATION RATE: 19 BRPM | SYSTOLIC BLOOD PRESSURE: 130 MMHG | DIASTOLIC BLOOD PRESSURE: 88 MMHG | HEIGHT: 69 IN

## 2025-07-02 DIAGNOSIS — K40.90 LEFT INGUINAL HERNIA: ICD-10-CM

## 2025-07-02 DIAGNOSIS — Z01.818 PREOP EXAMINATION: Primary | ICD-10-CM

## 2025-07-02 DIAGNOSIS — I10 PRIMARY HYPERTENSION: ICD-10-CM

## 2025-07-02 DIAGNOSIS — Z01.818 PREOP EXAMINATION: ICD-10-CM

## 2025-07-02 LAB
ANION GAP SERPL CALC-SCNC: 7 MMOL/L (ref 0–18)
ATRIAL RATE: 72 BPM
BASOPHILS # BLD AUTO: 0.06 X10(3) UL (ref 0–0.2)
BASOPHILS NFR BLD AUTO: 1.1 %
BUN BLD-MCNC: 22 MG/DL (ref 9–23)
CALCIUM BLD-MCNC: 9.8 MG/DL (ref 8.7–10.6)
CHLORIDE SERPL-SCNC: 105 MMOL/L (ref 98–112)
CO2 SERPL-SCNC: 30 MMOL/L (ref 21–32)
CREAT BLD-MCNC: 1.13 MG/DL (ref 0.7–1.3)
EGFRCR SERPLBLD CKD-EPI 2021: 81 ML/MIN/1.73M2 (ref 60–?)
EOSINOPHIL # BLD AUTO: 0.12 X10(3) UL (ref 0–0.7)
EOSINOPHIL NFR BLD AUTO: 2.2 %
ERYTHROCYTE [DISTWIDTH] IN BLOOD BY AUTOMATED COUNT: 12.1 %
FASTING STATUS PATIENT QL REPORTED: NO
GLUCOSE BLD-MCNC: 96 MG/DL (ref 70–99)
HCT VFR BLD AUTO: 47.8 % (ref 39–53)
HGB BLD-MCNC: 16.5 G/DL (ref 13–17.5)
IMM GRANULOCYTES # BLD AUTO: 0.01 X10(3) UL (ref 0–1)
IMM GRANULOCYTES NFR BLD: 0.2 %
LYMPHOCYTES # BLD AUTO: 2.05 X10(3) UL (ref 1–4)
LYMPHOCYTES NFR BLD AUTO: 37.9 %
MCH RBC QN AUTO: 30.4 PG (ref 26–34)
MCHC RBC AUTO-ENTMCNC: 34.5 G/DL (ref 31–37)
MCV RBC AUTO: 88 FL (ref 80–100)
MONOCYTES # BLD AUTO: 0.38 X10(3) UL (ref 0.1–1)
MONOCYTES NFR BLD AUTO: 7 %
NEUTROPHILS # BLD AUTO: 2.79 X10 (3) UL (ref 1.5–7.7)
NEUTROPHILS # BLD AUTO: 2.79 X10(3) UL (ref 1.5–7.7)
NEUTROPHILS NFR BLD AUTO: 51.6 %
OSMOLALITY SERPL CALC.SUM OF ELEC: 297 MOSM/KG (ref 275–295)
P AXIS: 59 DEGREES
P-R INTERVAL: 168 MS
PLATELET # BLD AUTO: 233 10(3)UL (ref 150–450)
POTASSIUM SERPL-SCNC: 4.1 MMOL/L (ref 3.5–5.1)
Q-T INTERVAL: 382 MS
QRS DURATION: 88 MS
QTC CALCULATION (BEZET): 418 MS
R AXIS: -5 DEGREES
RBC # BLD AUTO: 5.43 X10(6)UL (ref 4.3–5.7)
SODIUM SERPL-SCNC: 142 MMOL/L (ref 136–145)
T AXIS: 10 DEGREES
VENTRICULAR RATE: 72 BPM
WBC # BLD AUTO: 5.4 X10(3) UL (ref 4–11)

## 2025-07-02 PROCEDURE — 93000 ELECTROCARDIOGRAM COMPLETE: CPT

## 2025-07-02 PROCEDURE — 3075F SYST BP GE 130 - 139MM HG: CPT

## 2025-07-02 PROCEDURE — 99214 OFFICE O/P EST MOD 30 MIN: CPT

## 2025-07-02 PROCEDURE — 80048 BASIC METABOLIC PNL TOTAL CA: CPT

## 2025-07-02 PROCEDURE — 85025 COMPLETE CBC W/AUTO DIFF WBC: CPT

## 2025-07-02 PROCEDURE — 3008F BODY MASS INDEX DOCD: CPT

## 2025-07-02 PROCEDURE — 3079F DIAST BP 80-89 MM HG: CPT

## 2025-07-02 NOTE — PROGRESS NOTES
[facility] Family Medicine Office Note  Chief Complaint:   Chief Complaint   Patient presents with    Pre-Op Exam     L inguinal hernia repair with Kristen Granger on 07/14/2025 at [facility]       HPI related to surgery:   Gal Cardenas is a 45 year old male who presents for a pre-operative physical exam.   Gal Cardenas is scheduled for a robotic laparoscopic left inguinal hernia repair with mesh procedure at [facility] on 7/14/2025 to be performed by Dr Alexandra Peterson.     Indication: Left Inguinal Hernia    Patient reports previous anesthesia for surgery?:  Yes.    Previous surgical complications?: No  Family hx of sudden cardiac death or cardiac issues during surgery?: No  Personal history of PE/DVT?: no  Family history of VTE?: no  Is patient willing to accept any blood products during surgery if needed? Yes    Short Social Hx on File[1]   Past Medical History[2]  Past Surgical History[3]  Allergies[4]  Current Medications[5]     REVIEW OF SYSTEMS:   GENERAL HEALTH:  Good energy level, good appetite.  SKIN: no unusual skin lesions or rashes  EYES: no visual  deficits  HEENT: no nasal congestion, sinus pain or sore throat  RESPIRATORY:no shortness of breath, wheezing or cough   CARDIOVASCULAR: denies chest pain, abnormal movement of the chest.  GI: no nausea, vomiting, constipation, diarrhea;   GENITAL/:left inguinal hernia, no pain  MUSCULOSKELETAL: no joint problems upper or lower extremities  NEURO: no convulsions, abnormal sensation, no issues with sleeping.  PSYCHE: no hyper- or hypoactivity, good energy level.  HEMATOLOGY: no bruising or bleeding    PHYSICAL EXAM:   /88   Pulse 80   Resp 19   Ht 5' 9\" (1.753 m)   Wt 215 lb (97.5 kg)   SpO2 97%   BMI 31.75 kg/m²    General: No acute distress. Alert and oriented x 3.  HEENT: Moist mucous membranes. EOM-I. PERRL  Neck: No lymphadenopathy.  No JVD. No carotid bruits.  Respiratory: Clear to auscultation bilaterally.  No  wheezes. No rhonchi.  Cardiovascular: S1, S2.  Regular rate and rhythm.  No murmurs. Equal pulses   Abdomen: Soft, nontender, nondistended.  Positive bowel sounds. No rebound tenderness  : Left inguinal hernia present extending into scrotum. No signs of strangulation or incarceration. No skin color changes or pain with palpation. Although, pt does express overall discomfort.  Neurologic: No focal neurological deficits.  Musculoskeletal: Full range of motion of all extremities.  No swelling noted.  Integument: No lesions. No erythema.  Psychiatric: Appropriate mood and affect.    LABORATORY DATA:   CBC, BMP ordered today    Orders Placed This Encounter    Basic Metabolic Panel (8) [E]     Standing Status:   Future     Number of Occurrences:   1     Expected Date:   7/2/2025     Expiration Date:   7/2/2026     Release to patient:   Immediate    CBC With Differential With Platelet     Standing Status:   Future     Number of Occurrences:   1     Expected Date:   7/2/2025     Expiration Date:   7/2/2026      EKG: no acute process, NSR, normal rate and rhythm. Left ventricular hypertophy, possible left atrial enlargement, no acute ST depression or ST elevation.      ASSESSMENT AND PLAN:   Assessment:  1. Preop examination  Gal Cardenas presents for a pre-operative exam for scheduled for a robotic laparoscopic left inguinal hernia repair with mesh procedure at [facility] on 7/14/2025 to be performed by Dr Alexandra Peterson.    Gal Cardenas remains stable for surgery pending labs. EKG performed today shows no acute process, NSR, normal rate and rhythm. Left ventricular hypertophy, possible left atrial enlargement, no acute ST depression or ST elevation.  The left ventricular hypertrophy is most likely due to his hx of HTN. Although, his htn is stable today at 130/88 on lisinopril 10-12.5mg daily  Gal Cadrenas has no significant history of  pulmonary conditions.     Gal CHYNA Ronald was reminded of the  following for medications prior to surgery:  Lisinopril-hydrochlorothiazide 10-12.5mg - Hold the day of the surgery to prevent hypotension  Multivitamin -stop 7 days prior to surgery occurring  Fish oil -stop 7 days prior to surgery occurring  Ascorbic acid -stop 7 days prior to surgery occurring    Gal Cardenas is reminded to stop any NSAIDS medication and all vitamins 7 days prior to the surgery  Gal Cardenas reminded to be P/O prior to surgery and to consult with surgeon for how long prior to surgery.  Gal Carednas reminded to visit primary care provider again if falls ill prior to surgery occurring.  - EKG with interpretation and Report -IN OFFICE [57507]  - Basic Metabolic Panel (8) [E]; Future  - CBC With Differential With Platelet; Future    2. Left inguinal hernia  Scheduled for a robotic laparoscopic left inguinal hernia repair with mesh procedure at [facility] on 7/14/2025 to be performed by Dr Alexandra Peterson. No strangulation or incarceration seen on exam today.    3. Primary hypertension  Stable at 130/88 today. CPM of Lisinopril-hydrochlorothiazide 10-12.5mg. Hold Lisinopril-hydrochlorothiazide 10-12.5mg the day of the surgery to prevent hypotension.        9:37pm 7/2/25 Addendum  Pre-operative labs were reviewed. CBC and BMP overall stable  For this intermediate risk procedure, Gal Cardenas is a good surgical candidate and is of average acceptable risk for surgery.     This consult will be sent back the referring physician, Dr. Peterson.      Meds, Orders, & Refills for this Visit:  Requested Prescriptions      No prescriptions requested or ordered in this encounter       Orders Placed This Encounter    Basic Metabolic Panel (8) [E]     Standing Status:   Future     Number of Occurrences:   1     Expected Date:   7/2/2025     Expiration Date:   7/2/2026     Release to patient:   Immediate    CBC With Differential With Platelet     Standing Status:   Future     Number of Occurrences:    1     Expected Date:   7/2/2025     Expiration Date:   7/2/2026          Health Maintenance:  Health Maintenance Due   Topic Date Due    COVID-19 Vaccine (4 - 2024-25 season) 09/01/2024    Annual Depression Screening  01/01/2025    HTN: BP Follow-Up  07/24/2025       Patient/Caregiver Education: Patient/Caregiver Education: There are no barriers to learning. Medical education done.   Outcome: Gal Cardenas verbalizes understanding, agrees with the plan, and had no other questions at the end of today's visit. Gal Cardenas is informed to call with any questions, complications, allergies, or worsening or changing symptoms.  Gal Cardenas is to call with any side effects or complications from the treatments as a result of today.     Problem List:  Problem List[6]    Return if symptoms worsen or fail to improve.          [1]   Social History  Socioeconomic History    Marital status:    Tobacco Use    Smoking status: Never    Smokeless tobacco: Never   Vaping Use    Vaping status: Never Used   Substance and Sexual Activity    Alcohol use: No    Drug use: Never   Other Topics Concern    Caffeine Concern No    Stress Concern No    Weight Concern Yes     Comment: Gained weight the last year    Special Diet No    Exercise No    Seat Belt No     Social Drivers of Health     Food Insecurity: No Food Insecurity (6/20/2025)    NCSS - Food Insecurity     Worried About Running Out of Food in the Last Year: No     Ran Out of Food in the Last Year: No   Transportation Needs: No Transportation Needs (6/20/2025)    NCSS - Transportation     Lack of Transportation: No   Housing Stability: Not At Risk (6/20/2025)    NCSS - Housing/Utilities     Has Housing: Yes     Worried About Losing Housing: No     Unable to Get Utilities: No   [2]   Past Medical History:   Allergic rhinitis    Bloating    Decorative tattoo    Elevated bilirubin    on labs    Hand pain    intermittent, job-related    High blood pressure    Knee  problem    L knee infection, hospitalized [facility]    Lipid screening    Seasonal allergies    Visual impairment    glasses   [3]   Past Surgical History:  Procedure Laterality Date    Colonoscopy N/A 1/14/2025    Procedure: COLONOSCOPY with single balloon and forcep polypectomy;  Surgeon: Marco A May MD;  Location:  ENDOSCOPY    Vasectomy  2018   [4]   Allergies  Allergen Reactions    Amoxicillin RASH    Pcn [Penicillins] RASH   [5]   Current Outpatient Medications   Medication Sig Dispense Refill    lisinopril-hydroCHLOROthiazide 10-12.5 MG Oral Tab Take 1 tablet by mouth daily. 90 tablet 3    Multiple Vitamin (ONE-A-DAY MENS) Oral Tab Take by mouth.      Fish Oil-Cholecalciferol (OMEGA-3 FISH OIL/VITAMIN D3 OR) Take by mouth.      Ascorbic Acid ([employer] VITAMIN C OR) Take by mouth.     [6]   Patient Active Problem List  Diagnosis    Seasonal allergies    Blood pressure elevated without history of HTN    Primary hypertension    Sprain of medial meniscus of left knee    Special screening for malignant neoplasms, colon

## 2025-07-02 NOTE — PATIENT INSTRUCTIONS
Gal Cardenas was reminded of the following for medications prior to surgery:  Multivitamin -stop 7 days prior to surgery occurring  Fish oil -stop 7 days prior to surgery occurring  Ascorbic acid -stop 7 days prior to surgery occurring  hold lisinopril-hydrochlorothiazide the day of the surgery     stop any NSAIDS medication and all vitamins 7 days prior to the surgery   be P/O prior to surgery and to consult with surgeon for how long prior to surgery.   visit primary care provider again if falls ill prior to surgery occurring.

## 2025-07-03 ENCOUNTER — TELEPHONE (OUTPATIENT)
Dept: FAMILY MEDICINE CLINIC | Facility: CLINIC | Age: 45
End: 2025-07-03

## 2025-07-14 ENCOUNTER — HOSPITAL ENCOUNTER (OUTPATIENT)
Facility: HOSPITAL | Age: 45
Discharge: HOME | End: 2025-07-15
Attending: STUDENT IN AN ORGANIZED HEALTH CARE EDUCATION/TRAINING PROGRAM | Admitting: STUDENT IN AN ORGANIZED HEALTH CARE EDUCATION/TRAINING PROGRAM
Payer: COMMERCIAL

## 2025-07-14 ENCOUNTER — ANESTHESIA (OUTPATIENT)
Dept: SURGERY | Facility: HOSPITAL | Age: 45
End: 2025-07-14
Payer: COMMERCIAL

## 2025-07-14 ENCOUNTER — ANESTHESIA EVENT (OUTPATIENT)
Dept: SURGERY | Facility: HOSPITAL | Age: 45
End: 2025-07-14
Payer: COMMERCIAL

## 2025-07-14 DIAGNOSIS — Z98.890 S/P INGUINAL HERNIA REPAIR: Primary | ICD-10-CM

## 2025-07-14 DIAGNOSIS — Z87.19 S/P INGUINAL HERNIA REPAIR: Primary | ICD-10-CM

## 2025-07-14 PROCEDURE — 49650 LAP ING HERNIA REPAIR INIT: CPT | Performed by: STUDENT IN AN ORGANIZED HEALTH CARE EDUCATION/TRAINING PROGRAM

## 2025-07-14 PROCEDURE — 49650 LAP ING HERNIA REPAIR INIT: CPT | Performed by: PHYSICIAN ASSISTANT

## 2025-07-14 DEVICE — VISTASEAL FIBRIN SEAL 4 ML: Type: IMPLANTABLE DEVICE | Site: INGUINAL | Status: FUNCTIONAL

## 2025-07-14 DEVICE — 3DMAX MID ANATOMICAL MESH, 12 CM X 17 CM (5" X 7"), EXTRA LARGE, LEFT
Type: IMPLANTABLE DEVICE | Site: INGUINAL | Status: FUNCTIONAL
Brand: 3DMAX

## 2025-07-14 RX ORDER — GLYCOPYRROLATE 0.2 MG/ML
INJECTION, SOLUTION INTRAMUSCULAR; INTRAVENOUS AS NEEDED
Status: DISCONTINUED | OUTPATIENT
Start: 2025-07-14 | End: 2025-07-14 | Stop reason: SURG

## 2025-07-14 RX ORDER — HYDROMORPHONE HYDROCHLORIDE 1 MG/ML
0.8 INJECTION, SOLUTION INTRAMUSCULAR; INTRAVENOUS; SUBCUTANEOUS EVERY 2 HOUR PRN
Refills: 0 | Status: DISCONTINUED | OUTPATIENT
Start: 2025-07-14 | End: 2025-07-15

## 2025-07-14 RX ORDER — LIDOCAINE HYDROCHLORIDE 10 MG/ML
INJECTION, SOLUTION EPIDURAL; INFILTRATION; INTRACAUDAL; PERINEURAL AS NEEDED
Status: DISCONTINUED | OUTPATIENT
Start: 2025-07-14 | End: 2025-07-14 | Stop reason: SURG

## 2025-07-14 RX ORDER — ENOXAPARIN SODIUM 100 MG/ML
40 INJECTION SUBCUTANEOUS DAILY
Status: DISCONTINUED | OUTPATIENT
Start: 2025-07-15 | End: 2025-07-15

## 2025-07-14 RX ORDER — HEPARIN SODIUM 5000 [USP'U]/ML
5000 INJECTION, SOLUTION INTRAVENOUS; SUBCUTANEOUS ONCE
Status: COMPLETED | OUTPATIENT
Start: 2025-07-14 | End: 2025-07-14

## 2025-07-14 RX ORDER — ROCURONIUM BROMIDE 10 MG/ML
INJECTION, SOLUTION INTRAVENOUS AS NEEDED
Status: DISCONTINUED | OUTPATIENT
Start: 2025-07-14 | End: 2025-07-14 | Stop reason: SURG

## 2025-07-14 RX ORDER — BISACODYL 10 MG
10 SUPPOSITORY, RECTAL RECTAL
Status: DISCONTINUED | OUTPATIENT
Start: 2025-07-14 | End: 2025-07-15

## 2025-07-14 RX ORDER — DEXAMETHASONE SODIUM PHOSPHATE 4 MG/ML
VIAL (ML) INJECTION AS NEEDED
Status: DISCONTINUED | OUTPATIENT
Start: 2025-07-14 | End: 2025-07-14 | Stop reason: SURG

## 2025-07-14 RX ORDER — SODIUM CHLORIDE 9 MG/ML
INJECTION, SOLUTION INTRAVENOUS CONTINUOUS PRN
Status: DISCONTINUED | OUTPATIENT
Start: 2025-07-14 | End: 2025-07-14 | Stop reason: SURG

## 2025-07-14 RX ORDER — ACETAMINOPHEN 500 MG
1000 TABLET ORAL EVERY 8 HOURS SCHEDULED
Status: DISCONTINUED | OUTPATIENT
Start: 2025-07-14 | End: 2025-07-15

## 2025-07-14 RX ORDER — HYDROMORPHONE HYDROCHLORIDE 1 MG/ML
0.4 INJECTION, SOLUTION INTRAMUSCULAR; INTRAVENOUS; SUBCUTANEOUS EVERY 5 MIN PRN
Status: DISCONTINUED | OUTPATIENT
Start: 2025-07-14 | End: 2025-07-14 | Stop reason: HOSPADM

## 2025-07-14 RX ORDER — ONDANSETRON 2 MG/ML
4 INJECTION INTRAMUSCULAR; INTRAVENOUS EVERY 6 HOURS PRN
Status: DISCONTINUED | OUTPATIENT
Start: 2025-07-14 | End: 2025-07-15

## 2025-07-14 RX ORDER — HYDROMORPHONE HYDROCHLORIDE 1 MG/ML
0.2 INJECTION, SOLUTION INTRAMUSCULAR; INTRAVENOUS; SUBCUTANEOUS EVERY 5 MIN PRN
Status: DISCONTINUED | OUTPATIENT
Start: 2025-07-14 | End: 2025-07-14 | Stop reason: HOSPADM

## 2025-07-14 RX ORDER — PROCHLORPERAZINE EDISYLATE 5 MG/ML
5 INJECTION INTRAMUSCULAR; INTRAVENOUS EVERY 8 HOURS PRN
Status: DISCONTINUED | OUTPATIENT
Start: 2025-07-14 | End: 2025-07-14 | Stop reason: HOSPADM

## 2025-07-14 RX ORDER — ACETAMINOPHEN 500 MG
1000 TABLET ORAL ONCE AS NEEDED
Status: DISCONTINUED | OUTPATIENT
Start: 2025-07-14 | End: 2025-07-14 | Stop reason: HOSPADM

## 2025-07-14 RX ORDER — MIDAZOLAM HYDROCHLORIDE 1 MG/ML
INJECTION INTRAMUSCULAR; INTRAVENOUS AS NEEDED
Status: DISCONTINUED | OUTPATIENT
Start: 2025-07-14 | End: 2025-07-14 | Stop reason: SURG

## 2025-07-14 RX ORDER — OXYCODONE HYDROCHLORIDE 5 MG/1
5 TABLET ORAL EVERY 4 HOURS PRN
Qty: 10 TABLET | Refills: 0 | Status: SHIPPED | OUTPATIENT
Start: 2025-07-14

## 2025-07-14 RX ORDER — PROCHLORPERAZINE EDISYLATE 5 MG/ML
5 INJECTION INTRAMUSCULAR; INTRAVENOUS EVERY 8 HOURS PRN
Status: DISCONTINUED | OUTPATIENT
Start: 2025-07-14 | End: 2025-07-15

## 2025-07-14 RX ORDER — ONDANSETRON 2 MG/ML
INJECTION INTRAMUSCULAR; INTRAVENOUS AS NEEDED
Status: DISCONTINUED | OUTPATIENT
Start: 2025-07-14 | End: 2025-07-14 | Stop reason: SURG

## 2025-07-14 RX ORDER — OXYCODONE HYDROCHLORIDE 10 MG/1
10 TABLET ORAL EVERY 4 HOURS PRN
Refills: 0 | Status: DISCONTINUED | OUTPATIENT
Start: 2025-07-14 | End: 2025-07-15

## 2025-07-14 RX ORDER — SCOPOLAMINE 1 MG/3D
1 PATCH, EXTENDED RELEASE TRANSDERMAL ONCE
Status: DISCONTINUED | OUTPATIENT
Start: 2025-07-14 | End: 2025-07-15

## 2025-07-14 RX ORDER — ACETAMINOPHEN 500 MG
1000 TABLET ORAL ONCE
Status: DISCONTINUED | OUTPATIENT
Start: 2025-07-14 | End: 2025-07-14 | Stop reason: HOSPADM

## 2025-07-14 RX ORDER — HYDROMORPHONE HYDROCHLORIDE 1 MG/ML
0.4 INJECTION, SOLUTION INTRAMUSCULAR; INTRAVENOUS; SUBCUTANEOUS EVERY 2 HOUR PRN
Refills: 0 | Status: DISCONTINUED | OUTPATIENT
Start: 2025-07-14 | End: 2025-07-15

## 2025-07-14 RX ORDER — NALOXONE HYDROCHLORIDE 0.4 MG/ML
0.08 INJECTION, SOLUTION INTRAMUSCULAR; INTRAVENOUS; SUBCUTANEOUS AS NEEDED
Status: DISCONTINUED | OUTPATIENT
Start: 2025-07-14 | End: 2025-07-14 | Stop reason: HOSPADM

## 2025-07-14 RX ORDER — SODIUM PHOSPHATE, DIBASIC AND SODIUM PHOSPHATE, MONOBASIC 7; 19 G/230ML; G/230ML
1 ENEMA RECTAL ONCE AS NEEDED
Status: DISCONTINUED | OUTPATIENT
Start: 2025-07-14 | End: 2025-07-15

## 2025-07-14 RX ORDER — SODIUM CHLORIDE, SODIUM LACTATE, POTASSIUM CHLORIDE, CALCIUM CHLORIDE 600; 310; 30; 20 MG/100ML; MG/100ML; MG/100ML; MG/100ML
INJECTION, SOLUTION INTRAVENOUS CONTINUOUS
Status: DISCONTINUED | OUTPATIENT
Start: 2025-07-14 | End: 2025-07-15

## 2025-07-14 RX ORDER — BUPIVACAINE HYDROCHLORIDE 2.5 MG/ML
INJECTION, SOLUTION EPIDURAL; INFILTRATION; INTRACAUDAL; PERINEURAL AS NEEDED
Status: DISCONTINUED | OUTPATIENT
Start: 2025-07-14 | End: 2025-07-14 | Stop reason: HOSPADM

## 2025-07-14 RX ORDER — HYDROCODONE BITARTRATE AND ACETAMINOPHEN 5; 325 MG/1; MG/1
2 TABLET ORAL ONCE AS NEEDED
Status: DISCONTINUED | OUTPATIENT
Start: 2025-07-14 | End: 2025-07-14 | Stop reason: HOSPADM

## 2025-07-14 RX ORDER — SODIUM CHLORIDE, SODIUM LACTATE, POTASSIUM CHLORIDE, CALCIUM CHLORIDE 600; 310; 30; 20 MG/100ML; MG/100ML; MG/100ML; MG/100ML
INJECTION, SOLUTION INTRAVENOUS CONTINUOUS
Status: DISCONTINUED | OUTPATIENT
Start: 2025-07-14 | End: 2025-07-14 | Stop reason: HOSPADM

## 2025-07-14 RX ORDER — SENNOSIDES 8.6 MG
17.2 TABLET ORAL NIGHTLY PRN
Status: DISCONTINUED | OUTPATIENT
Start: 2025-07-14 | End: 2025-07-15

## 2025-07-14 RX ORDER — OXYCODONE HYDROCHLORIDE 5 MG/1
5 TABLET ORAL EVERY 4 HOURS PRN
Refills: 0 | Status: DISCONTINUED | OUTPATIENT
Start: 2025-07-14 | End: 2025-07-15

## 2025-07-14 RX ORDER — ONDANSETRON 2 MG/ML
4 INJECTION INTRAMUSCULAR; INTRAVENOUS EVERY 6 HOURS PRN
Status: DISCONTINUED | OUTPATIENT
Start: 2025-07-14 | End: 2025-07-14 | Stop reason: HOSPADM

## 2025-07-14 RX ORDER — HYDROCODONE BITARTRATE AND ACETAMINOPHEN 5; 325 MG/1; MG/1
1 TABLET ORAL ONCE AS NEEDED
Status: DISCONTINUED | OUTPATIENT
Start: 2025-07-14 | End: 2025-07-14 | Stop reason: HOSPADM

## 2025-07-14 RX ORDER — KETOROLAC TROMETHAMINE 30 MG/ML
INJECTION, SOLUTION INTRAMUSCULAR; INTRAVENOUS AS NEEDED
Status: DISCONTINUED | OUTPATIENT
Start: 2025-07-14 | End: 2025-07-14 | Stop reason: SURG

## 2025-07-14 RX ORDER — EPHEDRINE SULFATE 50 MG/ML
INJECTION INTRAVENOUS AS NEEDED
Status: DISCONTINUED | OUTPATIENT
Start: 2025-07-14 | End: 2025-07-14 | Stop reason: SURG

## 2025-07-14 RX ORDER — POLYETHYLENE GLYCOL 3350 17 G/17G
17 POWDER, FOR SOLUTION ORAL DAILY PRN
Status: DISCONTINUED | OUTPATIENT
Start: 2025-07-14 | End: 2025-07-15

## 2025-07-14 RX ORDER — HYDROMORPHONE HYDROCHLORIDE 1 MG/ML
0.6 INJECTION, SOLUTION INTRAMUSCULAR; INTRAVENOUS; SUBCUTANEOUS EVERY 5 MIN PRN
Status: DISCONTINUED | OUTPATIENT
Start: 2025-07-14 | End: 2025-07-14 | Stop reason: HOSPADM

## 2025-07-14 RX ORDER — SODIUM CHLORIDE 9 MG/ML
INJECTION, SOLUTION INTRAVENOUS CONTINUOUS
Status: DISCONTINUED | OUTPATIENT
Start: 2025-07-14 | End: 2025-07-15

## 2025-07-14 RX ADMIN — ROCURONIUM BROMIDE 10 MG: 10 INJECTION, SOLUTION INTRAVENOUS at 21:16:00

## 2025-07-14 RX ADMIN — ROCURONIUM BROMIDE 20 MG: 10 INJECTION, SOLUTION INTRAVENOUS at 20:12:00

## 2025-07-14 RX ADMIN — EPHEDRINE SULFATE 5 MG: 50 INJECTION INTRAVENOUS at 19:44:00

## 2025-07-14 RX ADMIN — ONDANSETRON 4 MG: 2 INJECTION INTRAMUSCULAR; INTRAVENOUS at 21:41:00

## 2025-07-14 RX ADMIN — SODIUM CHLORIDE, SODIUM LACTATE, POTASSIUM CHLORIDE, CALCIUM CHLORIDE: 600; 310; 30; 20 INJECTION, SOLUTION INTRAVENOUS at 18:45:00

## 2025-07-14 RX ADMIN — ROCURONIUM BROMIDE 20 MG: 10 INJECTION, SOLUTION INTRAVENOUS at 20:02:00

## 2025-07-14 RX ADMIN — LIDOCAINE HYDROCHLORIDE 50 MG: 10 INJECTION, SOLUTION EPIDURAL; INFILTRATION; INTRACAUDAL; PERINEURAL at 18:52:00

## 2025-07-14 RX ADMIN — GLYCOPYRROLATE 0.2 MG: 0.2 INJECTION, SOLUTION INTRAMUSCULAR; INTRAVENOUS at 19:13:00

## 2025-07-14 RX ADMIN — KETOROLAC TROMETHAMINE 30 MG: 30 INJECTION, SOLUTION INTRAMUSCULAR; INTRAVENOUS at 22:11:00

## 2025-07-14 RX ADMIN — SODIUM CHLORIDE: 9 INJECTION, SOLUTION INTRAVENOUS at 20:19:00

## 2025-07-14 RX ADMIN — DEXAMETHASONE SODIUM PHOSPHATE 4 MG: 4 MG/ML VIAL (ML) INJECTION at 18:55:00

## 2025-07-14 RX ADMIN — MIDAZOLAM HYDROCHLORIDE 2 MG: 1 INJECTION INTRAMUSCULAR; INTRAVENOUS at 18:47:00

## 2025-07-14 RX ADMIN — ROCURONIUM BROMIDE 50 MG: 10 INJECTION, SOLUTION INTRAVENOUS at 18:52:00

## 2025-07-14 RX ADMIN — EPHEDRINE SULFATE 5 MG: 50 INJECTION INTRAVENOUS at 19:15:00

## 2025-07-14 NOTE — ANESTHESIA PREPROCEDURE EVALUATION
PRE-OP EVALUATION    Patient Name: Gal Cardenas    Admit Diagnosis: Left inguinal hernia [K40.90]    Pre-op Diagnosis: Left inguinal hernia [K40.90]    ROBOTIC LAPAROSCOPIC LEFT INGUINAL HERNIA REPAIR WITH MESH    Anesthesia Procedure: ROBOTIC LAPAROSCOPIC LEFT INGUINAL HERNIA REPAIR WITH MESH (Left)    Surgeons and Role:     * Kristen Morris MD - Primary    Pre-op vitals reviewed.  Temp: 98.3 °F (36.8 °C)  Pulse: 78  Resp: 16  BP: 173/105  SpO2: 98 %  Body mass index is 32.25 kg/m².    Current medications reviewed.  Hospital Medications:  Current Medications[1]    Outpatient Medications:   Prescriptions Prior to Admission[2]    Allergies: Amoxicillin and Pcn [penicillins]      Anesthesia Evaluation    Patient summary reviewed.    Anesthetic Complications  (-) history of anesthetic complications         GI/Hepatic/Renal    Negative GI/hepatic/renal ROS.                             Cardiovascular        Exercise tolerance: good     MET: >4      (+) hypertension                                     Endo/Other    Negative endo/other ROS.                              Pulmonary    Negative pulmonary ROS.                       Neuro/Psych    Negative neuro/psych ROS.                                  Past Surgical History[3]  Social Hx on file[4]  History   Drug Use Unknown     Available pre-op labs reviewed.  Lab Results   Component Value Date    WBC 5.4 07/02/2025    RBC 5.43 07/02/2025    HGB 16.5 07/02/2025    HCT 47.8 07/02/2025    MCV 88.0 07/02/2025    MCH 30.4 07/02/2025    MCHC 34.5 07/02/2025    RDW 12.1 07/02/2025    .0 07/02/2025     Lab Results   Component Value Date     07/02/2025    K 4.1 07/02/2025     07/02/2025    CO2 30.0 07/02/2025    BUN 22 07/02/2025    CREATSERUM 1.13 07/02/2025    GLU 96 07/02/2025    CA 9.8 07/02/2025            Airway      Mallampati: III  Mouth opening: 3 FB  TM distance: 4 - 6 cm  Neck ROM: full Cardiovascular      Rhythm: regular  Rate: normal      Dental    Dentition appears grossly intact         Pulmonary      Breath sounds clear to auscultation bilaterally.               Other findings              ASA: 2   Plan: general  NPO status verified and patient meets guidelines.    Post-procedure pain management plan discussed with surgeon and patient.      Plan/risks discussed with: patient and spouse (Risks of general anesthesia discussed with patient, including nausea/vomiting, sore throat, dental injury, aspiration, allergic reaction, and cardiopulmonary compromise. All of their questions were answered and they are in agreement with the plan.)                Present on Admission:  **None**             [1]    [Transfer Hold] acetaminophen (Tylenol Extra Strength) tab 1,000 mg  1,000 mg Oral Once    [Transfer Hold] scopolamine (Transderm-Scop) 1 MG/3DAYS patch 1 patch  1 patch Transdermal Once    lactated ringers infusion   Intravenous Continuous    [COMPLETED] heparin (Porcine) 5000 UNIT/ML injection 5,000 Units  5,000 Units Subcutaneous Once    ceFAZolin (Ancef) 2g in 10mL IV syringe premix  2 g Intravenous Once   [2]   Medications Prior to Admission   Medication Sig Dispense Refill Last Dose/Taking    lisinopril-hydroCHLOROthiazide 10-12.5 MG Oral Tab Take 1 tablet by mouth daily. 90 tablet 3 7/12/2025    Multiple Vitamin (ONE-A-DAY MENS) Oral Tab Take by mouth.   7/9/2025    Fish Oil-Cholecalciferol (OMEGA-3 FISH OIL/VITAMIN D3 OR) Take by mouth.   6/30/2025    Ascorbic Acid ([employer]S VITAMIN C OR) Take by mouth.   7/9/2025   [3]   Past Surgical History:  Procedure Laterality Date    Colonoscopy N/A 1/14/2025    Procedure: COLONOSCOPY with single balloon and forcep polypectomy;  Surgeon: Marco A May MD;  Location:  ENDOSCOPY    Vasectomy  2018   [4]   Social History  Socioeconomic History    Marital status:    Tobacco Use    Smoking status: Never    Smokeless tobacco: Never   Vaping Use    Vaping status: Never Used   Substance and Sexual  Activity    Alcohol use: No    Drug use: Never   Other Topics Concern    Caffeine Concern No    Stress Concern No    Weight Concern Yes     Comment: Gained weight the last year    Special Diet No    Exercise No    Seat Belt No

## 2025-07-14 NOTE — INTERVAL H&P NOTE
Pre-op Diagnosis: Left inguinal hernia [K40.90]    The above referenced H&P was reviewed by Kristen Morris MD on 7/14/2025, the patient was examined and no significant changes have occurred in the patient's condition since the H&P was performed.  I discussed with the patient and/or legal representative the potential benefits, risks and side effects of this procedure; the likelihood of the patient achieving goals; and potential problems that might occur during recuperation.  I discussed reasonable alternatives to the procedure, including risks, benefits and side effects related to the alternatives and risks related to not receiving this procedure.  We will proceed with procedure as planned.

## 2025-07-14 NOTE — DISCHARGE INSTRUCTIONS
Home Care Instructions  Robot-Assisted Laparoscopic Inguinal Hernia Repair  Dr Kristen Morrsi        WHAT TO EXPECT  You may feel pain at the incisions or where your hernia used to be. This is due to stitches placed during the surgery.    You may feel pain and bruising in the groin. You may notice swelling of the scrotum. This is common and will resolve.    You may feel pain in the shoulders. This is due to irritation of the diaphragm by the air used to inflate the abdomen.    You may feel a sore throat. This is due to the breathing tube used during surgery.     You may feel mild nausea and vomiting for the first 24 hours, this should resolve quickly.    You may have constipation, especially if taking narcotic pain medications. If you have not had a bowel movement by 48 hours after surgery, take Miralax 17g (one cap full) every 12 hours until you have a bowel movement. If another 24 hours goes by without a bowel movement, then take a dose of magnesium citrate or milk of magnesia.     MEDICATIONS  Take 2 Extra Strength Tylenol (1000mg every) 8 hours for pain. For the first 3 days it is best to take the Tylenol every 8 hours even if you do not feel much pain.     Take Advil (ibuprofen) 800mg every 8 hours or Alleve (naproxen) 500mg every 12 hours, also for the first 3 days.     For moderate to severe pain take one Oxycodone pill (5mg) every six hours as needed for pain. If you do not feel that narcotics are necessary you shouldn’t take them. If the pain is severe you can take two pills (10mg) every six hours.    Please ask your surgeon before resuming blood thinners such as Aspirin, Plavix, Coumadin, Warfarin, Eliquis, or Xarelto. All other home medications may be resumed as scheduled.     DIET  Start with a light and bland diet and slowly advance to regular food as your appetite improves. There are no specific food restrictions. Do not eat excessively. Eat small frequent meals.     Drink plenty of water. Try to eat  a healthy high fiber diet.    Do not drink alcohol (beer, wine, liquor) or use tobacco products.    WOUND CARE  The incisions are covered with Skin Glue. You can shower 24 hours after surgery and get the dressings wet.    The skin glue will stay on for 10 to 14 days after surgery.     Soap and water can get on the incisions but do not scrub the wounds. No hair dye or chemicals of any kind should get on the incisions.     Do not apply any topical ointments such as Neosporin or Hydrogen Peroxide.    Do not swim or submerge the incisions under water for 1 month.    ACTIVITY  Every day you should be up walking around the house. Do not lie in bed all day. Staying active prevents blood clots and pneumonia.    You can go up and down stairs. Do not lift more than 20 pounds or perform strenuous activity that requires straining the core muscles.    You may ride in a car but should not drive the car for at least one week.     APPOINTMENT  Please call our office at [phone] soon to make an appointment.    For questions or concerns please call our office between 8:30 a.m. and 5 p.m. Monday through Friday. The number above directs to the answering service after hours to reach the on-call physician.    Please call our office immediately for fever greater than 100.5, excess bleeding, inability to urinate, severe abdominal pain, severe diarrhea, uncontrollable vomiting.      For life threatening emergencies such as severe chest pain, difficulty breathing, or loss of conciousness call 911.

## 2025-07-15 VITALS
OXYGEN SATURATION: 98 % | SYSTOLIC BLOOD PRESSURE: 144 MMHG | WEIGHT: 218.38 LBS | HEIGHT: 69 IN | RESPIRATION RATE: 16 BRPM | HEART RATE: 71 BPM | TEMPERATURE: 98 F | DIASTOLIC BLOOD PRESSURE: 85 MMHG | BODY MASS INDEX: 32.35 KG/M2

## 2025-07-15 NOTE — PROGRESS NOTES
Pt arrived on unit at 2315 from PACU accompanied by wife POD 0 Hernia repair with mesh. Pt is A&O x 4. Pt's lungs are clear, breathing unlabored. Vital signs are stable. Pt's diet is reg; tolerating PO intake, now SL.  Pt ambulates independently after set up. Skin admission assessment  revealed 5 abdominal lap sites closed with skin glue and reinforced with abdominal binder. POC discussed with pt who verbalized understanding. NOC safety plan in place, bed in low position, call light and personal items within reach, pt encouraged to call for assistance.  POC:  Discharge home when cleared

## 2025-07-15 NOTE — ANESTHESIA POSTPROCEDURE EVALUATION
[facility]    Gal Cardenas Patient Status:  Hospital Outpatient Surgery   Age/Gender 45 year old male MRN [id number]   Location [facility] POST ANESTHESIA CARE UNIT Attending Kristen Morris MD   Hosp Day # 0 PCP Heladio Craig MD       Anesthesia Post-op Note    ROBOTIC LAPAROSCOPIC LEFT INGUINAL HERNIA REPAIR WITH MESH    Procedure Summary       Date: 07/14/25 Room / Location:  MAIN OR 09 / [facility] MAIN OR    Anesthesia Start: 1844 Anesthesia Stop: 2236    Procedure: ROBOTIC LAPAROSCOPIC LEFT INGUINAL HERNIA REPAIR WITH MESH (Left: Abdomen) Diagnosis:       Left inguinal hernia      (Left inguinal hernia)    Surgeons: Kristen oMrris MD Anesthesiologist: Harpreet Servin MD    Anesthesia Type: general ASA Status: 2            Anesthesia Type: general    Vitals Value Taken Time   /91 07/14/25 22:36   Temp 99.9 °F (37.7 °C) 07/14/25 22:36   Pulse 97 07/14/25 22:36   Resp 19 07/14/25 22:36   SpO2 92 % 07/14/25 22:36           Patient Location: PACU    Anesthesia Type: general    Airway Patency: patent    Postop Pain Control: adequate    Mental Status: mildly sedated but able to meaningfully participate in the post-anesthesia evaluation    Nausea/Vomiting: none    Cardiopulmonary/Hydration status: stable euvolemic    Complications: no apparent anesthesia related complications    Postop vital signs: stable    Dental Exam: Unchanged from Preop    Patient to be discharged from PACU when criteria met.

## 2025-07-15 NOTE — PROGRESS NOTES
[facility]  Progress Note    Gal Cardenas Patient Status:  Outpatient in a Bed    1980 MRN [id number]   Location [facility] 3NW-A Attending Kristen Morris MD   Hosp Day # 0 PCP Heladio Craig MD     Subjective:  Patient is resting comfortably in hospital bed, family at bedside. He states he is feeling well this morning. He states he has some back soreness from the hospital bed, and some incisional tenderness. He states he has not had breakfast yet. He denies nausea or vomiting. He denies fever or chills    Objective/Physical Exam:  General: Alert, orientated x3.  Cooperative.  No apparent distress.  Vital Signs:  Blood pressure 144/85, pulse 71, temperature 97.8 °F (36.6 °C), temperature source Oral, resp. rate 16, height 69\", weight 218 lb 6.4 oz (99.1 kg), SpO2 98%.  Wt Readings from Last 3 Encounters:   25 218 lb 6.4 oz (99.1 kg)   25 215 lb (97.5 kg)   25 218 lb (98.9 kg)     Lungs: No respiratory distress.  Cardiac: Regular rate and rhythm.   Abdomen:  Soft, non distended, appropriately tender, with no rebound or guarding.  No peritoneal signs. No hernia.  Extremities:  No lower extremity edema noted.    Incision: Clean, dry, intact, no erythema      Intake/Output:    Intake/Output Summary (Last 24 hours) at 7/15/2025 0844  Last data filed at 7/15/2025 0337  Gross per 24 hour   Intake 3560 ml   Output 165 ml   Net 3395 ml     I/O last 3 completed shifts:  In: 3560 [P.O.:200; I.V.:3360]  Out: 165 [Urine:140; Blood:25]  No intake/output data recorded.    Medications: Scheduled Medications[1]    Labs:        No results found for: \"PT\", \"INR\"      Assessment  Problem List[2]    POD 1 Robotic laparoscopic left inguinal hernia repair with mesh    Plan:  Continue general diet as patient tolerates  Analgesics and antiemetics as needed  Ambulate and up to chair  Encourage incentive spirometry  Continue abdominal binder  DVT prophylaxis with Lovenox  The patient is stable for  discharge once tolerating diet and pain is controlled with oral analgesia.  The patient will follow up with Dr. Morris in 2 weeks.    Quality:  DVT Mechanical Prophylaxis:   SCDs, Early ambuation  DVT Pharmacologic Prophylaxis   Medication    enoxaparin (Lovenox) 40 MG/0.4ML SUBQ injection 40 mg                Code Status: Not on file  Turner: No urinary catheter in place  Turner Duration (in days):   Central line:    SATHYA: 7/15/2025        Maddy Graves PA-C  7/15/2025  8:44 AM      The patient was seen and examined with Dr. Morris.             [1]    [Transfer Hold] scopolamine  1 patch Transdermal Once    enoxaparin  40 mg Subcutaneous Daily    acetaminophen  1,000 mg Oral Q8H TRELL   [2]   Patient Active Problem List  Diagnosis    Seasonal allergies    Blood pressure elevated without history of HTN    Primary hypertension    Sprain of medial meniscus of left knee    Special screening for malignant neoplasms, colon    S/P inguinal hernia repair

## 2025-07-15 NOTE — BRIEF OP NOTE
Pre-Operative Diagnosis: Left inguinal hernia [K40.90]     Post-Operative Diagnosis: Left inguinal hernia      Procedure Performed:   ROBOTIC LAPAROSCOPIC LEFT INGUINAL HERNIA REPAIR WITH MESH    Surgeons and Role:     * Kristen Morris MD - Primary    Assistant(s):  Surgical Assistant.: Jasbir Browning  PA: Carmelina Cedillo PA-C     Surgical Findings: large direct sliding hernia with sigmoid colon      Specimen: none      Estimated Blood Loss: Blood Output: 25 mL (7/14/2025 10:13 PM)      Dictation Number:  tbd     Kristen Morris MD  7/14/2025  10:20 PM

## 2025-07-15 NOTE — CM/SW NOTE
07/15/25 1000   Discharge disposition   Expected discharge disposition Home or Self   Post Acute Care Provider Home   Discharge transportation Private car       CM reviewed patient chart.     Patient is a 45 year old male admitted s/p Robotic laparoscopic left inguinal hernia repair with mesh.   Patient is ambulating and tolerating PO intake per notes.     No discharge needs identified at this time. Anticipate discharge home today.     SW/CM to remain available for support and/or discharge planning.    Consuelo Salcido, BENSONN RN, CMSRN  RN Case Manager

## 2025-07-15 NOTE — PROGRESS NOTES
NURSING DISCHARGE NOTE    Discharged Home via Ambulatory.  Accompanied by Spouse  Belongings Taken by patient/family.    Discharge instructions given to patient. Patient verbalized understanding. Prescriptions sent to patient's pharmacy.

## 2025-07-24 PROBLEM — K40.30 INCARCERATED LEFT INGUINAL HERNIA: Status: ACTIVE | Noted: 2025-07-24

## 2025-07-24 NOTE — OPERATIVE REPORT
[facility]  Operative Note    Gal Cardenas Location: OR   CSN [id number] MRN [id number]    1980 Age 45 year old   Admission Date 2025 Operation Date 2025   Attending Physician No att. providers found Operating Physician Kristen Morris MD   PCP Heladio Craig MD          Patient Name: Gal Cardenas    Preoperative Diagnosis: Left incarcerated inguinal hernia    Postoperative Diagnosis: Same as pre-op diagnosis.    Primary Surgeon: Kristen Morris MD     Assistant: ALAYNA Bauman , Jasbir Browning SA    Anesthesia: General    Anesthesiologist: Anesthesiologist.: Harpreet Servin MD    Procedures: Robotic Left Inguinal Hernia Repair , Robotic Lysis of Adhesions    Implants: Bard 3D Mid XL Mesh    Specimen: none    Drains: None    Estimated Blood Loss: 5 mL    Complications: None    Condition: Good    Indications for Surgery:   Gal Cardenas is a 45 year old gentleman who presents with a painful enlarging bulge in the left groin. Physical exam shows a left inguinal hernia. The patient presents today for elective hernia repair.    Surgical Findings:   Large sliding incarcerated indirect inguinal hernia containing the entire sigmoid colon    Description of Procedure:   The patient was transported to the operating room and placed on the operating table in supine position. General endotracheal anesthesia was administered. The abdomen and groins were clipped, prepped and draped in sterile fashion. Pre-operative antibiotics were given. A time-out was performed.      A stab incision was made in the left upper quadrant at Leavitt's point. A Veress needle was inserted. Pneumoperitoneum was achieved to a pressure of 15mm Hg.  A transverse 8-mm supraumbilical incision was made. A 8mm trocar was placed with a laparoscope inserted visualizing the layers of the abdominal wall during entry. Diagnostic survey of the abdomen revealed no other acute pathology or iatrogenic injury. The  inguinal region was examined and the hernia defect was identified. Two 8-mm trocars were placed on either side of the abdomen in the midclavicular line under direct vision. The patient was placed in Trendelenburg position. The robot was then docked and instruments placed under direct vision.     The hernia contents were carefully reduced into the abdomen. The hernia was incarcerated and very chronic with multiple adhesions within the hernia sac. An additional 5 mm port was placed under direct vision in the right upper quadrant to allow for a bedside assist in retracting the hernia sac gently. The sigmoid colon and mesenteric fat was carefully pulled from the hernia defect and a sharp scissors was utilized to lyse all adhesions within the hernia sac. Close evaluation showed a sliding type hernia with the sigmoid colon comprising a portion of the hernia sac. Due to the chronicity of this incarceration performing the lysis of adhesions took approximately 90 minutes to complete. The sigmoid colon was evaluated extensively once reduced and appeared to be intact without injury.       At this point the dissection of the peritoneal portion of the hernia was started. The peritoneum was grasped, retracted, and incised approximately 5cm cephalad to the hernia defect. Blunt and sharp dissection were used with judicious electrocautery to create the pre-peritoneal pocket. Medial to lateral dissection was performed. We identifed initially the pubic tubercle and Octavio's ligament. Dissection was carried out to a level posterior to the pubic tubercle and across the midline. The soft tissue was dissected medial to the iliac vein clear the femoral space. Then the soft tissues were dissected laterally to create a large pre-peritoneal pocket to accommodate a mesh. Care was taken not to injure the femoral vessels, the epigastric vessels, or any nerve structures during dissection.    Blunt and sharp dissection were used to dissect the  hernia sac away from the spermatic cord. Care was taken to not injure the vas deferens or the spermatic vessels. The hernia sac was chronically adherent to the vas deferens and spermatic vessels and thus required another 90 minutes of very careful lysis of adhesions to separate it off the cord structures while maintaining them intact. The colon was inspected again once the reduction was complete and the colon appeared intact without injury. The peritoneal flap was dissected back sufficiently so that retraction on the flap did not manipulate the spermatic cord structures. A cord lipoma was identified and dissected away from the spermatic cord.A Bard 3D Mid XL mesh was placed in our dissected pocket and we ensured adequate coverage of all potential hernia sites. A 3-0 Vicryl suture was used to affix the medial aspect of the mesh to the pubic tubercle, and the superior aspect to the rectus abdominis muscle. The peritoneal flap was then reapproximated using running 2-0 V-Loc suture.       All instruments were removed and the robot undocked. All trocars were removed under direct visualization and pneumoperitoneum was released.  All wounds were cleansed and irrigated. The skin incisions were reapproximated using subcuticular 4-0 Monocryl suture. Local anesthetic was injected.  Skin glue was used to seal the incisions.     The patient's drapes were removed and the patient was awakened from anesthesia. They were then transported to the recovery room in stable condition. The patient tolerated the procedure well without apparent complication. All needle, sponge, instrument counts correct at the end of procedure.      Kristen Morris MD

## 2025-07-29 ENCOUNTER — OFFICE VISIT (OUTPATIENT)
Facility: LOCATION | Age: 45
End: 2025-07-29
Payer: COMMERCIAL

## 2025-07-29 VITALS
TEMPERATURE: 98 F | SYSTOLIC BLOOD PRESSURE: 137 MMHG | DIASTOLIC BLOOD PRESSURE: 88 MMHG | RESPIRATION RATE: 20 BRPM | HEART RATE: 91 BPM | OXYGEN SATURATION: 96 %

## 2025-07-29 DIAGNOSIS — Z98.890 POST-OPERATIVE STATE: Primary | ICD-10-CM

## 2025-07-29 PROCEDURE — 3075F SYST BP GE 130 - 139MM HG: CPT

## 2025-07-29 PROCEDURE — 3079F DIAST BP 80-89 MM HG: CPT

## 2025-07-29 PROCEDURE — 99024 POSTOP FOLLOW-UP VISIT: CPT

## (undated) DEVICE — V2 SPECIMEN COLLECTION MANIFOLD KIT: Brand: NEPTUNE

## (undated) DEVICE — TIP COVER ACCESSORY

## (undated) DEVICE — BINDER ABD 2XL W9IN CIRC 62-73IN 3 PNL E HK

## (undated) DEVICE — KIT VLV 5 PC AIR H2O SUCT BX ENDOGATOR CONN

## (undated) DEVICE — AIRSEAL BIFURCATED FILTERED TUBESET WITH ACTIVATED CHARCOAL FILTER: Brand: AIRSEAL

## (undated) DEVICE — 3M™ IOBAN™ 2 ANTIMICROBIAL INCISE DRAPE 6648EZ: Brand: IOBAN™ 2

## (undated) DEVICE — SKN PREP SPNG STKS PVP PNT STR: Brand: MEDLINE INDUSTRIES, INC.

## (undated) DEVICE — COVER,LIGHT,CAMERA,HARD,1/PK,STRL: Brand: MEDLINE

## (undated) DEVICE — SEAL

## (undated) DEVICE — GLOVE SUR 7 SENSICARE PI PIP CRM PWD F

## (undated) DEVICE — DRAPE,TOP,102X53,STERILE: Brand: MEDLINE

## (undated) DEVICE — 10FT COMBINED O2 DELIVERY/CO2 MONITORING. FILTER WITH MICROSTREAM TYPE LUER: Brand: DUAL ADULT NASAL CANNULA

## (undated) DEVICE — 1200CC GUARDIAN II: Brand: GUARDIAN

## (undated) DEVICE — COLUMN DRAPE

## (undated) DEVICE — ROBOTIC GENERAL: Brand: MEDLINE INDUSTRIES, INC.

## (undated) DEVICE — SINGLE USE SPLINTING TUBE: Brand: SINGLE USE SPLINTING TUBE

## (undated) DEVICE — BLADELESS OBTURATOR: Brand: WECK VISTA

## (undated) DEVICE — MONOPOLAR CURVED SCISSORS: Brand: ENDOWRIST

## (undated) DEVICE — INSUFFLATION NEEDLE TO ESTABLISH PNEUMOPERITONEUM.: Brand: INSUFFLATION NEEDLE

## (undated) DEVICE — 40580 - THE PINK PAD - ADVANCED TRENDELENBURG POSITIONING KIT: Brand: 40580 - THE PINK PAD - ADVANCED TRENDELENBURG POSITIONING KIT

## (undated) DEVICE — GLOVE SUR 6.5 SENSICARE PI PIP CRM PWD F

## (undated) DEVICE — EXOFIN PRECISION PEN HIGH VISCOSITY TOPICAL SKIN ADHESIVE: Brand: EXOFIN PRECISION PEN, 1G

## (undated) DEVICE — GIJAW SINGLE-USE BIOPSY FORCEPS WITH NEEDLE: Brand: GIJAW

## (undated) DEVICE — LAPAROSCOPIC DUAL RIGID APPLICATOR: Brand: ETHICON

## (undated) DEVICE — KIT CUSTOM ENDOPROCEDURE STERIS

## (undated) DEVICE — AIRSEAL 8 MM CANNULA CAP AND OBTURATOR WITH BLADELESS OPTICAL TIP COMPATIBLE WITH INTUITIVE DA VINCI XI AND DA VINCI X 8 MM INSTRUMENT CANNULA, STANDARD LENGTH: Brand: AIRSEAL

## (undated) DEVICE — GLOVE SUR 7.5 SENSICARE PI PIP GRN PWD F

## (undated) DEVICE — LAPAROVUE VISIBILITY SYSTEM LAPAROSCOPIC SOLUTIONS: Brand: LAPAROVUE

## (undated) DEVICE — TRAY CATH 16FR F INCL BARDX IC COMPLT CARE

## (undated) DEVICE — SUT MCRYL 4-0 18IN PS-2 ABSRB UD 19MM 3/8 CIR

## (undated) DEVICE — MEGA NEEDLE DRIVER: Brand: ENDOWRIST

## (undated) DEVICE — VIOLET BRAIDED (POLYGLACTIN 910), SYNTHETIC ABSORBABLE SUTURE: Brand: COATED VICRYL

## (undated) DEVICE — 3M™ RED DOT™ MONITORING ELECTRODE WITH FOAM TAPE AND STICKY GEL, 50/BAG, 20/CASE, 72/PLT 2570: Brand: RED DOT™

## (undated) DEVICE — STERILE DRAPE FOR USE WITH SITUATE ROOM SCANNER: Brand: SITUATE

## (undated) DEVICE — FORCE BIPOLAR: Brand: ENDOWRIST

## (undated) DEVICE — ANTIBACTERIAL UNDYED BRAIDED (POLYGLACTIN 910), SYNTHETIC ABSORBABLE SUTURE: Brand: COATED VICRYL

## (undated) DEVICE — ABSORBABLE WOUND CLOSURE DEVICE: Brand: V-LOC 90

## (undated) DEVICE — ARM DRAPE

## (undated) NOTE — LETTER
OUTSIDE TESTING RESULT REQUEST     IMPORTANT: FOR YOUR IMMEDIATE ATTENTION  Please FAX all test results listed below to: [phone]     Testing already done on or about: 2025     * * * * If testing is NOT complete, arrange with patient A.S.A.P. * * * *      Patient Name: Gal Cardenas  Surgery Date: 2025  Medical Record: [id number]  CSN: [id number]  : 1980 - A: 45 y     Sex: male  Surgeon(s):  Kristen Morris MD  Procedure: ROBOTIC LAPAROSCOPIC LEFT INGUINAL HERNIA REPAIR WITH MESH  Anesthesia Type: General     Surgeon: Kristen Morris MD     The following Testing and Time Line are REQUIRED PER ANESTHESIA     EKG READ AND SIGNED WITHIN   90 days  BMP (requires 4 hour fast) within  90 days      Thank You,   Sent by:Melanie HUTCHISON

## (undated) NOTE — LETTER
12/20/17        [address]      Dear Marissa Ferguson,    1997 [facility] records indicate that you have outstanding lab work and or testing that was ordered for you and has not yet been completed:          CBC With Diff      CM

## (undated) NOTE — LETTER
[address]  [id number]  Authorization for Surgical Operation and Procedure     Date:___________                                                                                                         Time:__________  I hereby authorize Surgeon(s):  Kristen Morris MD, my physician and his/her assistants (if applicable), which may include medical students, residents, and/or fellows, to perform the following surgical operation/ procedure and administer such anesthesia as may be determined necessary by my physician:  Operation/Procedure name (s) Procedure(s):  ROBOTIC LAPAROSCOPIC LEFT INGUINAL HERNIA REPAIR WITH MESH on Gal Kesslerziggy   2.   I recognize that during the surgical operation/procedure, unforeseen conditions may necessitate additional or different procedures than those listed above.  I, therefore, further authorize and request that the above-named surgeon, assistants, or designees perform such procedures as are, in their judgment, necessary and desirable.    3.   My surgeon/physician has discussed prior to my surgery the potential benefits, risks and side effects of this procedure; the likelihood of achieving goals; and potential problems that might occur during recuperation.  They also discussed reasonable alternatives to the procedure, including risks, benefits, and side effects related to the alternatives and risks related to not receiving this procedure.  I have had all my questions answered and I acknowledge that no guarantee has been made as to the result that may be obtained.    4.   Should the need arise during my operation/procedure, which includes change of level of care prior to discharge, I also consent to the administration of blood and/or blood products.  Further, I understand that despite careful testing and screening of blood or blood products by collecting agencies, I may still be subject to ill effects as a result of receiving a blood  transfusion and/or blood products.  The following are some, but not all, of the potential risks that can occur: fever and allergic reactions, hemolytic reactions, transmission of diseases such as Hepatitis, AIDS and Cytomegalovirus (CMV) and fluid overload.  In the event that I wish to have an autologous transfusion of my own blood, or a directed donor transfusion, I will discuss this with my physician.  Check only if Refusing Blood or Blood Products  I understand refusal of blood or blood products as deemed necessary by my physician may have serious consequences to my condition to include possible death. I hereby assume responsibility for my refusal and release the hospital, its personnel, and my physicians from any responsibility for the consequences of my refusal.          o  Refuse      5.   I authorize the use of any specimen, organs, tissues, body parts or foreign objects that may be removed from my body during the operation/procedure for diagnosis, research or teaching purposes and their subsequent disposal by hospital authorities.  I also authorize the release of specimen test results and/or written reports to my treating physician on the hospital medical staff or other referring or consulting physicians involved in my care, at the discretion of the Pathologist or my treating physician.    6.   I consent to the photographing or videotaping of the operations or procedures to be performed, including appropriate portions of my body for medical, scientific, or educational purposes, provided my identity is not revealed by the pictures or by descriptive texts accompanying them.  If the procedure has been photographed/videotaped, the surgeon will obtain the original picture, image, videotape or CD.  The hospital will not be responsible for storage, release or maintenance of the picture, image, tape or CD.    7.   I consent to the presence of a  or observers in the operating room as deemed  necessary by my physician or their designees.    8.   I recognize that in the event my procedure results in extended X-Ray/fluoroscopy time, I may develop a skin reaction.    9. If I have a Do Not Attempt Resuscitation (DNAR) order in place, that status will be suspended while in the operating room, procedural suite, and during the recovery period unless otherwise explicitly stated by me (or a person authorized to consent on my behalf). The surgeon or my attending physician will determine when the applicable recovery period ends for purposes of reinstating the DNAR order.  10. Patients having a sterilization procedure: I understand that if the procedure is successful the results will be permanent and it will therefore be impossible for me to inseminate, conceive, or bear children.  I also understand that the procedure is intended to result in sterility, although the result has not been guaranteed.   11. I acknowledge that my physician has explained sedation/analgesia administration to me including the risk and benefits I consent to the administration of sedation/analgesia as may be necessary or desirable in the judgment of my physician.    I CERTIFY THAT I HAVE READ AND FULLY UNDERSTAND THE ABOVE CONSENT TO OPERATION and/or OTHER PROCEDURE.    _________________________________________  __________________________________  Signature of Patient     Signature of Responsible Person         ___________________________________         Printed Name of Responsible Person           _________________________________                 Relationship to Patient  _________________________________________  ______________________________  Signature of Witness          Date  Time      Patient Name: Gal Quiros Ronald     : 1980                 Printed: 2025     Medical Record #: [id number]                     Page 1 of 2                                    [address]   [id number]    Consent for Anesthesia    I, Gal Cardenas agree to be cared for by an anesthesiologist, who is specially trained to monitor me and give me medicine to put me to sleep or keep me comfortable during my procedure    I understand that my anesthesiologist is not an employee or agent of [facility] or [employer]. He or she works for [email or web address]    As the patient asking for anesthesia services, I agree to:  Allow the anesthesiologist (anesthesia doctor) to give me medicine and do additional procedures as necessary. Some examples are: Starting or using an “IV” to give me medicine, fluids or blood during my procedure, and having a breathing tube placed to help me breathe when I’m asleep (intubation). In the event that my heart stops working properly, I understand that my anesthesiologist will make every effort to sustain my life, unless otherwise directed by [facility] Do Not Resuscitate documents.  Tell my anesthesia doctor before my procedure:  If I am pregnant.  The last time that I ate or drank.  All of the medicines I take (including prescriptions, herbal supplements, and pills I can buy without a prescription (including street drugs/illegal medications). Failure to inform my anesthesiologist about these medicines may increase my risk of anesthetic complications.  If I am allergic to anything or have had a reaction to anesthesia before.  I understand how the anesthesia medicine will help me (benefits).  I understand that with any type of anesthesia medicine there are risks:  The most common risks are: nausea, vomiting, sore throat, muscle soreness, damage to my eyes, mouth, or teeth (from breathing tube placement).  Rare risks include: remembering what happened during my procedure, allergic reactions to medications, injury to my airway, heart, lungs, vision, nerves, or muscles and in extremely rare instances death.  My doctor has explained to me other choices  available to me for my care (alternatives).  Pregnant Patients (“epidural”):  I understand that the risks of having an epidural (medicine given into my back to help control pain during labor), include itching, low blood pressure, difficulty urinating, headache or slowing of the baby’s heart. Very rare risks include infection, bleeding, seizure, irregular heart rhythms and nerve injury.  Regional Anesthesia (“spinal”, “epidural”, & “nerve blocks”):  I understand that rare but potential complications include headache, bleeding, infection, seizure, irregular heart rhythms, and nerve injury.    I can change my mind about having anesthesia services at any time before I get the medicine.    _____________________________________________________________________________  Patient (or Representative) Signature/Relationship to Patient  Date   Time    _____________________________________________________________________________   Name (if used)    Language/Organization   Time    _____________________________________________________________________________  Anesthesiologist Signature     Date   Time  I have discussed the procedure and information above with the patient (or patient’s representative) and answered their questions. The patient or their representative has agreed to have anesthesia services.    _____________________________________________________________________________  Witness        Date   Time  I have verified that the signature is that of the patient or patient’s representative, and that it was signed before the procedure  Patient Name: Gal Cardenas     : 1980                 Printed: 2025     Medical Record #: [id number]                     Page 2 of 2